# Patient Record
Sex: FEMALE | Race: WHITE | Employment: OTHER | URBAN - METROPOLITAN AREA
[De-identification: names, ages, dates, MRNs, and addresses within clinical notes are randomized per-mention and may not be internally consistent; named-entity substitution may affect disease eponyms.]

---

## 2017-01-27 ENCOUNTER — OFFICE VISIT (OUTPATIENT)
Dept: ORTHOPEDIC SURGERY | Age: 82
End: 2017-01-27

## 2017-01-27 ENCOUNTER — TELEPHONE (OUTPATIENT)
Dept: ORTHOPEDIC SURGERY | Age: 82
End: 2017-01-27

## 2017-01-27 VITALS
SYSTOLIC BLOOD PRESSURE: 147 MMHG | DIASTOLIC BLOOD PRESSURE: 82 MMHG | HEART RATE: 79 BPM | HEIGHT: 66 IN | BODY MASS INDEX: 25.39 KG/M2 | WEIGHT: 158 LBS

## 2017-01-27 DIAGNOSIS — S86.001A ACHILLES TENDON INJURY, RIGHT, INITIAL ENCOUNTER: Primary | ICD-10-CM

## 2017-01-27 PROCEDURE — L4361 PNEUMA/VAC WALK BOOT PRE OTS: HCPCS | Performed by: ORTHOPAEDIC SURGERY

## 2017-01-27 PROCEDURE — 99213 OFFICE O/P EST LOW 20 MIN: CPT | Performed by: ORTHOPAEDIC SURGERY

## 2017-01-27 RX ORDER — M-VIT,TX,IRON,MINS/CALC/FOLIC 27MG-0.4MG
1 TABLET ORAL DAILY
COMMUNITY

## 2017-02-14 ENCOUNTER — OFFICE VISIT (OUTPATIENT)
Dept: ORTHOPEDIC SURGERY | Age: 82
End: 2017-02-14

## 2017-02-14 VITALS
BODY MASS INDEX: 25.4 KG/M2 | DIASTOLIC BLOOD PRESSURE: 83 MMHG | WEIGHT: 158.07 LBS | HEIGHT: 66 IN | SYSTOLIC BLOOD PRESSURE: 127 MMHG | HEART RATE: 118 BPM

## 2017-02-14 DIAGNOSIS — S86.001D ACHILLES TENDON INJURY, RIGHT, SUBSEQUENT ENCOUNTER: Primary | ICD-10-CM

## 2017-02-14 PROCEDURE — 99213 OFFICE O/P EST LOW 20 MIN: CPT | Performed by: ORTHOPAEDIC SURGERY

## 2017-02-17 ENCOUNTER — TELEPHONE (OUTPATIENT)
Dept: ORTHOPEDIC SURGERY | Age: 82
End: 2017-02-17

## 2017-02-22 ENCOUNTER — HOSPITAL ENCOUNTER (OUTPATIENT)
Dept: GENERAL RADIOLOGY | Age: 82
Discharge: HOME | End: 2017-02-22

## 2017-02-22 LAB
A/G RATIO: 1.1 (ref 1.1–2.2)
ALBUMIN SERPL-MCNC: 4.1 G/DL (ref 3.4–5)
ALP BLD-CCNC: 104 U/L (ref 40–129)
ALT SERPL-CCNC: 14 U/L (ref 10–40)
ANION GAP SERPL CALCULATED.3IONS-SCNC: 12 MMOL/L (ref 3–16)
AST SERPL-CCNC: 19 U/L (ref 15–37)
BASOPHILS ABSOLUTE: 0.1 K/UL (ref 0–0.2)
BASOPHILS RELATIVE PERCENT: 1.2 %
BILIRUB SERPL-MCNC: 0.4 MG/DL (ref 0–1)
BUN BLDV-MCNC: 34 MG/DL (ref 7–20)
CALCIUM SERPL-MCNC: 10.6 MG/DL (ref 8.3–10.6)
CHLORIDE BLD-SCNC: 105 MMOL/L (ref 99–110)
CHOLESTEROL, TOTAL: 179 MG/DL (ref 0–199)
CO2: 26 MMOL/L (ref 21–32)
CREAT SERPL-MCNC: 1.4 MG/DL (ref 0.6–1.2)
CREATININE URINE: 99.4 MG/DL (ref 28–259)
EOSINOPHILS ABSOLUTE: 0.1 K/UL (ref 0–0.6)
EOSINOPHILS RELATIVE PERCENT: 1.1 %
GFR AFRICAN AMERICAN: 43
GFR NON-AFRICAN AMERICAN: 35
GLOBULIN: 3.6 G/DL
GLUCOSE BLD-MCNC: 89 MG/DL (ref 70–99)
HCT VFR BLD CALC: 41.1 % (ref 36–48)
HDLC SERPL-MCNC: 64 MG/DL (ref 40–60)
HEMOGLOBIN: 13.6 G/DL (ref 12–16)
IRON SATURATION: 42 % (ref 15–50)
IRON: 117 UG/DL (ref 37–145)
LDL CHOLESTEROL CALCULATED: 95 MG/DL
LYMPHOCYTES ABSOLUTE: 1.6 K/UL (ref 1–5.1)
LYMPHOCYTES RELATIVE PERCENT: 30.7 %
MCH RBC QN AUTO: 30.2 PG (ref 26–34)
MCHC RBC AUTO-ENTMCNC: 33.1 G/DL (ref 31–36)
MCV RBC AUTO: 91.1 FL (ref 80–100)
MICROALBUMIN UR-MCNC: <1.2 MG/DL
MICROALBUMIN/CREAT UR-RTO: NORMAL MG/G (ref 0–30)
MONOCYTES ABSOLUTE: 0.6 K/UL (ref 0–1.3)
MONOCYTES RELATIVE PERCENT: 10.7 %
NEUTROPHILS ABSOLUTE: 3 K/UL (ref 1.7–7.7)
NEUTROPHILS RELATIVE PERCENT: 56.3 %
PARATHYROID HORMONE INTACT: 31.3 PG/ML (ref 14–72)
PDW BLD-RTO: 14.1 % (ref 12.4–15.4)
PLATELET # BLD: 200 K/UL (ref 135–450)
PMV BLD AUTO: 7.7 FL (ref 5–10.5)
POTASSIUM SERPL-SCNC: 4.3 MMOL/L (ref 3.5–5.1)
RBC # BLD: 4.51 M/UL (ref 4–5.2)
SODIUM BLD-SCNC: 143 MMOL/L (ref 136–145)
T3 FREE: 2.4 PG/ML (ref 2.3–4.2)
T4 FREE: 1.1 NG/DL (ref 0.9–1.8)
THYROID PEROXIDASE (TPO) ABS: 15 IU/ML
TOTAL IRON BINDING CAPACITY: 279 UG/DL (ref 260–445)
TOTAL PROTEIN: 7.7 G/DL (ref 6.4–8.2)
TRIGL SERPL-MCNC: 99 MG/DL (ref 0–150)
TSH REFLEX: 3.86 UIU/ML (ref 0.27–4.2)
VITAMIN D 25-HYDROXY: 58.7 NG/ML
VLDLC SERPL CALC-MCNC: 20 MG/DL
WBC # BLD: 5.3 K/UL (ref 4–11)

## 2017-03-07 ENCOUNTER — TELEPHONE (OUTPATIENT)
Dept: ORTHOPEDIC SURGERY | Age: 82
End: 2017-03-07

## 2017-03-14 ENCOUNTER — OFFICE VISIT (OUTPATIENT)
Dept: ORTHOPEDIC SURGERY | Age: 82
End: 2017-03-14

## 2017-03-14 VITALS
DIASTOLIC BLOOD PRESSURE: 58 MMHG | HEIGHT: 66 IN | WEIGHT: 162.92 LBS | SYSTOLIC BLOOD PRESSURE: 107 MMHG | BODY MASS INDEX: 26.18 KG/M2 | HEART RATE: 72 BPM

## 2017-03-14 DIAGNOSIS — S86.001D ACHILLES TENDON INJURY, RIGHT, SUBSEQUENT ENCOUNTER: Primary | ICD-10-CM

## 2017-03-14 PROCEDURE — 99024 POSTOP FOLLOW-UP VISIT: CPT | Performed by: ORTHOPAEDIC SURGERY

## 2017-03-14 PROCEDURE — 29405 APPL SHORT LEG CAST: CPT | Performed by: ORTHOPAEDIC SURGERY

## 2017-03-28 ENCOUNTER — OFFICE VISIT (OUTPATIENT)
Dept: ORTHOPEDIC SURGERY | Age: 82
End: 2017-03-28

## 2017-03-28 VITALS
HEART RATE: 62 BPM | WEIGHT: 162.92 LBS | SYSTOLIC BLOOD PRESSURE: 111 MMHG | DIASTOLIC BLOOD PRESSURE: 54 MMHG | BODY MASS INDEX: 26.18 KG/M2 | HEIGHT: 66 IN

## 2017-03-28 DIAGNOSIS — S86.001D ACHILLES TENDON INJURY, RIGHT, SUBSEQUENT ENCOUNTER: Primary | ICD-10-CM

## 2017-03-28 PROCEDURE — 99024 POSTOP FOLLOW-UP VISIT: CPT | Performed by: ORTHOPAEDIC SURGERY

## 2017-04-25 ENCOUNTER — OFFICE VISIT (OUTPATIENT)
Dept: ORTHOPEDIC SURGERY | Age: 82
End: 2017-04-25

## 2017-04-25 VITALS
SYSTOLIC BLOOD PRESSURE: 130 MMHG | WEIGHT: 162.92 LBS | DIASTOLIC BLOOD PRESSURE: 74 MMHG | HEART RATE: 83 BPM | HEIGHT: 66 IN | BODY MASS INDEX: 26.18 KG/M2

## 2017-04-25 DIAGNOSIS — S86.001D ACHILLES TENDON INJURY, RIGHT, SUBSEQUENT ENCOUNTER: Primary | ICD-10-CM

## 2017-04-25 PROCEDURE — 99024 POSTOP FOLLOW-UP VISIT: CPT | Performed by: ORTHOPAEDIC SURGERY

## 2017-06-06 ENCOUNTER — OFFICE VISIT (OUTPATIENT)
Dept: ORTHOPEDIC SURGERY | Age: 82
End: 2017-06-06

## 2017-06-06 VITALS
DIASTOLIC BLOOD PRESSURE: 76 MMHG | HEART RATE: 62 BPM | WEIGHT: 162.92 LBS | HEIGHT: 66 IN | SYSTOLIC BLOOD PRESSURE: 150 MMHG | BODY MASS INDEX: 26.18 KG/M2

## 2017-06-06 DIAGNOSIS — S86.001D ACHILLES TENDON INJURY, RIGHT, SUBSEQUENT ENCOUNTER: Primary | ICD-10-CM

## 2017-06-06 PROCEDURE — 99212 OFFICE O/P EST SF 10 MIN: CPT | Performed by: ORTHOPAEDIC SURGERY

## 2017-07-18 ENCOUNTER — OFFICE VISIT (OUTPATIENT)
Dept: ORTHOPEDIC SURGERY | Age: 82
End: 2017-07-18

## 2017-07-18 VITALS
BODY MASS INDEX: 25.86 KG/M2 | HEIGHT: 66 IN | SYSTOLIC BLOOD PRESSURE: 138 MMHG | WEIGHT: 160.94 LBS | HEART RATE: 62 BPM | DIASTOLIC BLOOD PRESSURE: 75 MMHG

## 2017-07-18 DIAGNOSIS — S86.001D ACHILLES TENDON INJURY, RIGHT, SUBSEQUENT ENCOUNTER: Primary | ICD-10-CM

## 2017-07-18 PROCEDURE — 99212 OFFICE O/P EST SF 10 MIN: CPT | Performed by: ORTHOPAEDIC SURGERY

## 2018-01-02 ENCOUNTER — OFFICE VISIT (OUTPATIENT)
Dept: ORTHOPEDIC SURGERY | Age: 83
End: 2018-01-02

## 2018-01-02 VITALS
BODY MASS INDEX: 25.86 KG/M2 | HEIGHT: 66 IN | SYSTOLIC BLOOD PRESSURE: 154 MMHG | HEART RATE: 73 BPM | WEIGHT: 160.94 LBS | DIASTOLIC BLOOD PRESSURE: 69 MMHG

## 2018-01-02 DIAGNOSIS — S86.001D INJURY OF RIGHT ACHILLES TENDON, SUBSEQUENT ENCOUNTER: Primary | ICD-10-CM

## 2018-01-02 PROCEDURE — 99212 OFFICE O/P EST SF 10 MIN: CPT | Performed by: ORTHOPAEDIC SURGERY

## 2018-08-19 ENCOUNTER — APPOINTMENT (OUTPATIENT)
Dept: GENERAL RADIOLOGY | Age: 83
DRG: 069 | End: 2018-08-19
Payer: COMMERCIAL

## 2018-08-19 ENCOUNTER — APPOINTMENT (OUTPATIENT)
Dept: CT IMAGING | Age: 83
DRG: 069 | End: 2018-08-19
Payer: COMMERCIAL

## 2018-08-19 ENCOUNTER — HOSPITAL ENCOUNTER (INPATIENT)
Age: 83
LOS: 1 days | Discharge: HOME | DRG: 069 | End: 2018-08-20
Attending: EMERGENCY MEDICINE | Admitting: HOSPITALIST
Payer: COMMERCIAL

## 2018-08-19 DIAGNOSIS — G45.9 TRANSIENT CEREBRAL ISCHEMIA, UNSPECIFIED TYPE: Primary | ICD-10-CM

## 2018-08-19 DIAGNOSIS — N30.00 ACUTE CYSTITIS WITHOUT HEMATURIA: ICD-10-CM

## 2018-08-19 PROBLEM — N39.0 UTI (URINARY TRACT INFECTION): Status: ACTIVE | Noted: 2018-08-19

## 2018-08-19 PROBLEM — R42 DIZZINESS: Status: ACTIVE | Noted: 2018-08-19

## 2018-08-19 LAB
A/G RATIO: 1.2 (ref 1.1–2.2)
ALBUMIN SERPL-MCNC: 3.9 G/DL (ref 3.4–5)
ALP BLD-CCNC: 81 U/L (ref 40–129)
ALT SERPL-CCNC: 12 U/L (ref 10–40)
ANION GAP SERPL CALCULATED.3IONS-SCNC: 12 MMOL/L (ref 3–16)
AST SERPL-CCNC: 19 U/L (ref 15–37)
BACTERIA: ABNORMAL /HPF
BASOPHILS ABSOLUTE: 0.1 K/UL (ref 0–0.2)
BASOPHILS RELATIVE PERCENT: 0.8 %
BILIRUB SERPL-MCNC: 0.3 MG/DL (ref 0–1)
BILIRUBIN URINE: NEGATIVE
BLOOD, URINE: NEGATIVE
BUN BLDV-MCNC: 31 MG/DL (ref 7–20)
CALCIUM SERPL-MCNC: 9.5 MG/DL (ref 8.3–10.6)
CHLORIDE BLD-SCNC: 107 MMOL/L (ref 99–110)
CLARITY: CLEAR
CO2: 25 MMOL/L (ref 21–32)
COLOR: YELLOW
CREAT SERPL-MCNC: 1.6 MG/DL (ref 0.6–1.2)
EOSINOPHILS ABSOLUTE: 0.1 K/UL (ref 0–0.6)
EOSINOPHILS RELATIVE PERCENT: 1.3 %
GFR AFRICAN AMERICAN: 37
GFR NON-AFRICAN AMERICAN: 30
GLOBULIN: 3.2 G/DL
GLUCOSE BLD-MCNC: 141 MG/DL (ref 70–99)
GLUCOSE URINE: NEGATIVE MG/DL
HCT VFR BLD CALC: 36.8 % (ref 36–48)
HEMOGLOBIN: 12.6 G/DL (ref 12–16)
KETONES, URINE: NEGATIVE MG/DL
LEUKOCYTE ESTERASE, URINE: ABNORMAL
LYMPHOCYTES ABSOLUTE: 1.9 K/UL (ref 1–5.1)
LYMPHOCYTES RELATIVE PERCENT: 29.2 %
MCH RBC QN AUTO: 31 PG (ref 26–34)
MCHC RBC AUTO-ENTMCNC: 34.3 G/DL (ref 31–36)
MCV RBC AUTO: 90.5 FL (ref 80–100)
MICROSCOPIC EXAMINATION: YES
MONOCYTES ABSOLUTE: 0.7 K/UL (ref 0–1.3)
MONOCYTES RELATIVE PERCENT: 11.7 %
NEUTROPHILS ABSOLUTE: 3.6 K/UL (ref 1.7–7.7)
NEUTROPHILS RELATIVE PERCENT: 57 %
NITRITE, URINE: POSITIVE
PDW BLD-RTO: 14.1 % (ref 12.4–15.4)
PH UA: 7.5
PLATELET # BLD: 199 K/UL (ref 135–450)
PMV BLD AUTO: 6.7 FL (ref 5–10.5)
POTASSIUM SERPL-SCNC: 4.4 MMOL/L (ref 3.5–5.1)
PROTEIN UA: NEGATIVE MG/DL
RBC # BLD: 4.06 M/UL (ref 4–5.2)
RBC UA: ABNORMAL /HPF (ref 0–2)
SODIUM BLD-SCNC: 144 MMOL/L (ref 136–145)
SPECIFIC GRAVITY UA: 1.01
SPECIMEN STATUS: NORMAL
TOTAL PROTEIN: 7.1 G/DL (ref 6.4–8.2)
TROPONIN: <0.01 NG/ML
URINE REFLEX TO CULTURE: YES
URINE TYPE: ABNORMAL
UROBILINOGEN, URINE: 0.2 E.U./DL
WBC # BLD: 6.4 K/UL (ref 4–11)
WBC UA: ABNORMAL /HPF (ref 0–5)

## 2018-08-19 PROCEDURE — 80053 COMPREHEN METABOLIC PANEL: CPT

## 2018-08-19 PROCEDURE — 87077 CULTURE AEROBIC IDENTIFY: CPT

## 2018-08-19 PROCEDURE — 93005 ELECTROCARDIOGRAM TRACING: CPT | Performed by: EMERGENCY MEDICINE

## 2018-08-19 PROCEDURE — 99285 EMERGENCY DEPT VISIT HI MDM: CPT

## 2018-08-19 PROCEDURE — 2580000003 HC RX 258: Performed by: EMERGENCY MEDICINE

## 2018-08-19 PROCEDURE — 1200000000 HC SEMI PRIVATE

## 2018-08-19 PROCEDURE — 87086 URINE CULTURE/COLONY COUNT: CPT

## 2018-08-19 PROCEDURE — 6360000002 HC RX W HCPCS: Performed by: EMERGENCY MEDICINE

## 2018-08-19 PROCEDURE — 87040 BLOOD CULTURE FOR BACTERIA: CPT

## 2018-08-19 PROCEDURE — 87801 DETECT AGNT MULT DNA AMPLI: CPT

## 2018-08-19 PROCEDURE — 6370000000 HC RX 637 (ALT 250 FOR IP): Performed by: EMERGENCY MEDICINE

## 2018-08-19 PROCEDURE — 6370000000 HC RX 637 (ALT 250 FOR IP): Performed by: HOSPITALIST

## 2018-08-19 PROCEDURE — 71046 X-RAY EXAM CHEST 2 VIEWS: CPT

## 2018-08-19 PROCEDURE — 70450 CT HEAD/BRAIN W/O DYE: CPT

## 2018-08-19 PROCEDURE — G0378 HOSPITAL OBSERVATION PER HR: HCPCS

## 2018-08-19 PROCEDURE — 2580000003 HC RX 258: Performed by: HOSPITALIST

## 2018-08-19 PROCEDURE — 85025 COMPLETE CBC W/AUTO DIFF WBC: CPT

## 2018-08-19 PROCEDURE — 96365 THER/PROPH/DIAG IV INF INIT: CPT

## 2018-08-19 PROCEDURE — 87186 SC STD MICRODIL/AGAR DIL: CPT

## 2018-08-19 PROCEDURE — 84484 ASSAY OF TROPONIN QUANT: CPT

## 2018-08-19 PROCEDURE — 81001 URINALYSIS AUTO W/SCOPE: CPT

## 2018-08-19 RX ORDER — SODIUM CHLORIDE 0.9 % (FLUSH) 0.9 %
10 SYRINGE (ML) INJECTION EVERY 12 HOURS SCHEDULED
Status: DISCONTINUED | OUTPATIENT
Start: 2018-08-19 | End: 2018-08-20 | Stop reason: HOSPADM

## 2018-08-19 RX ORDER — ASPIRIN 81 MG/1
81 TABLET ORAL DAILY
Status: DISCONTINUED | OUTPATIENT
Start: 2018-08-20 | End: 2018-08-20 | Stop reason: HOSPADM

## 2018-08-19 RX ORDER — M-VIT,TX,IRON,MINS/CALC/FOLIC 27MG-0.4MG
1 TABLET ORAL DAILY
Status: DISCONTINUED | OUTPATIENT
Start: 2018-08-20 | End: 2018-08-20 | Stop reason: HOSPADM

## 2018-08-19 RX ORDER — ONDANSETRON 2 MG/ML
4 INJECTION INTRAMUSCULAR; INTRAVENOUS EVERY 6 HOURS PRN
Status: DISCONTINUED | OUTPATIENT
Start: 2018-08-19 | End: 2018-08-20 | Stop reason: HOSPADM

## 2018-08-19 RX ORDER — ASPIRIN 325 MG
325 TABLET ORAL ONCE
Status: COMPLETED | OUTPATIENT
Start: 2018-08-19 | End: 2018-08-19

## 2018-08-19 RX ORDER — LABETALOL HYDROCHLORIDE 5 MG/ML
10 INJECTION, SOLUTION INTRAVENOUS EVERY 10 MIN PRN
Status: DISCONTINUED | OUTPATIENT
Start: 2018-08-19 | End: 2018-08-20 | Stop reason: HOSPADM

## 2018-08-19 RX ORDER — SODIUM CHLORIDE 0.9 % (FLUSH) 0.9 %
10 SYRINGE (ML) INJECTION PRN
Status: DISCONTINUED | OUTPATIENT
Start: 2018-08-19 | End: 2018-08-20 | Stop reason: HOSPADM

## 2018-08-19 RX ORDER — ACETAMINOPHEN 325 MG/1
650 TABLET ORAL EVERY 4 HOURS PRN
Status: DISCONTINUED | OUTPATIENT
Start: 2018-08-19 | End: 2018-08-20 | Stop reason: HOSPADM

## 2018-08-19 RX ORDER — OXYBUTYNIN CHLORIDE 5 MG/1
5 TABLET, EXTENDED RELEASE ORAL DAILY
Status: DISCONTINUED | OUTPATIENT
Start: 2018-08-20 | End: 2018-08-20 | Stop reason: HOSPADM

## 2018-08-19 RX ORDER — ASPIRIN 81 MG/1
324 TABLET, CHEWABLE ORAL ONCE
Status: COMPLETED | OUTPATIENT
Start: 2018-08-19 | End: 2018-08-19

## 2018-08-19 RX ORDER — ATORVASTATIN CALCIUM 40 MG/1
40 TABLET, FILM COATED ORAL NIGHTLY
Status: DISCONTINUED | OUTPATIENT
Start: 2018-08-19 | End: 2018-08-20 | Stop reason: HOSPADM

## 2018-08-19 RX ADMIN — SODIUM CHLORIDE, PRESERVATIVE FREE 10 ML: 5 INJECTION INTRAVENOUS at 20:09

## 2018-08-19 RX ADMIN — ASPIRIN 325 MG: 325 TABLET ORAL at 20:04

## 2018-08-19 RX ADMIN — CEFTRIAXONE SODIUM 1 G: 1 INJECTION, POWDER, FOR SOLUTION INTRAMUSCULAR; INTRAVENOUS at 17:28

## 2018-08-19 RX ADMIN — ASPIRIN 81 MG 324 MG: 81 TABLET ORAL at 20:04

## 2018-08-19 RX ADMIN — ATORVASTATIN CALCIUM 40 MG: 40 TABLET, FILM COATED ORAL at 20:04

## 2018-08-19 RX ADMIN — APIXABAN 2.5 MG: 5 TABLET, FILM COATED ORAL at 20:05

## 2018-08-19 ASSESSMENT — ENCOUNTER SYMPTOMS
SORE THROAT: 0
VOMITING: 0
NAUSEA: 0
BACK PAIN: 0
EYE PAIN: 0
SHORTNESS OF BREATH: 0
COUGH: 0
ABDOMINAL PAIN: 0
EYE REDNESS: 0

## 2018-08-19 ASSESSMENT — PAIN SCALES - GENERAL: PAINLEVEL_OUTOF10: 0

## 2018-08-19 NOTE — ED PROVIDER NOTES
Emergency [facility]  ED    Patient: Dialn Cunningham  MRN: [id number]  : 1928  Date of Evaluation: 2018  ED Provider: Bharti Carlos DO    Chief Complaint       Chief Complaint   Patient presents with    Numbness     vertigo - has been dizzy and had a spell this morning, no nausea, vomiting, or diarrhea or chest pain. Pt states her blood pressure was low and then her left arm started to go numb. Her jaw started to go numb all started around 1300 today. HPI     History provided by patient and family  Mode of arrival: EMS  History limited by no limitations    Dilan Cunningham is a 80 y.o. female who presents to the emergency department With complaints of dizziness and numbness. States this started earlier today that she was dizzy. Also states she had numbness to her left face and left upper extremity. Unsure of exactly when this started. Per EMS, no neuro deficits. Patient asymptomatic on arrival.      ROS:     Review of Systems   Constitutional: Negative for chills and fever. HENT: Negative for congestion and sore throat. Eyes: Negative for pain and redness. Respiratory: Negative for cough and shortness of breath. Cardiovascular: Negative for chest pain and palpitations. Gastrointestinal: Negative for abdominal pain, nausea and vomiting. Genitourinary: Negative for dysuria and frequency. Musculoskeletal: Negative for back pain and gait problem. Skin: Negative for pallor and rash. Neurological: Positive for dizziness and numbness. Negative for headaches. Psychiatric/Behavioral: Negative for agitation and confusion. All other systems reviewed and are negative.       Past History     Past Medical History:   Diagnosis Date    A-fib [facility])     Achilles tendon injury 2016    Arthritis     Hyperlipidemia     Hypertension     Kidney insufficiency     MI, old     PONV (postoperative nausea and vomiting)     Urinary frequency     UTI (urinary tract infection) 8/19/2018     Past Surgical History:   Procedure Laterality Date    ACHILLES TENDON SURGERY  03/02/2017    R achilles tendon debridement     BACK SURGERY  2003    back surgery x3; lower back    CARPAL TUNNEL RELEASE Right 2002    CORONARY ANGIOPLASTY WITH STENT PLACEMENT  2006    HIP SURGERY Left 1/21/2016    hip pinning    HYSTERECTOMY  1965     Social History     Social History    Marital status:      Spouse name: N/A    Number of children: N/A    Years of education: N/A     Social History Main Topics    Smoking status: Never Smoker    Smokeless tobacco: Never Used    Alcohol use No    Drug use: No    Sexual activity: No     Other Topics Concern    None     Social History Narrative    None     History reviewed. No pertinent family history. Medications/Allergies     Previous Medications    ACETAMINOPHEN 650 MG TABS    Take 650 mg by mouth every 4 hours as needed    CRESTOR 10 MG TABLET    TK 1 T PO QOD (pt states she takes 3-4 tabs per week)    ELIQUIS 2.5 MG TABS TABLET    TK 1 T PO BID    LISINOPRIL (PRINIVIL;ZESTRIL) 20 MG TABLET    Take 20 mg by mouth daily    METOPROLOL SUCCINATE (TOPROL XL) 25 MG EXTENDED RELEASE TABLET    Take 25 mg by mouth 2 times daily    MULTIPLE VITAMINS-MINERALS (THERAPEUTIC MULTIVITAMIN-MINERALS) TABLET    Take 1 tablet by mouth daily    OXYBUTYNIN (DITROPAN XL) 5 MG EXTENDED RELEASE TABLET    Take 1 tablet by mouth daily     Allergies   Allergen Reactions    Epinephrine      \"knocks me out\"    Other      Novocaine - when at the dentist        Physical Exam       ED Triage Vitals [08/19/18 1520]   BP Temp Temp Source Pulse Resp SpO2 Height Weight   (!) 180/52 97.8 °F (36.6 °C) Oral 64 11 100 % 5' 6\" (1.676 m) 161 lb (73 kg)       Physical Exam   Constitutional: She is oriented to person, place, and time. She appears well-developed and well-nourished. No distress. HENT:   Head: Normocephalic and atraumatic.    Nose: Nose normal.   Eyes: Pupils are equal, round, and reactive to light. Conjunctivae and EOM are normal.   Neck: Normal range of motion. Neck supple. Cardiovascular: Normal rate, regular rhythm and normal heart sounds. Pulmonary/Chest: Effort normal and breath sounds normal. No respiratory distress. She has no wheezes. She has no rales. Abdominal: Soft. Bowel sounds are normal. She exhibits no distension. There is no tenderness. There is no rebound. Musculoskeletal: Normal range of motion. She exhibits no edema, tenderness or deformity. Neurological: She is alert and oriented to person, place, and time. She has normal strength. No cranial nerve deficit or sensory deficit. GCS eye subscore is 4. GCS verbal subscore is 5. GCS motor subscore is 6. Skin: Skin is warm and dry. No rash noted. She is not diaphoretic. No erythema. No pallor. Psychiatric: She has a normal mood and affect. Her behavior is normal. Thought content normal.   Nursing note and vitals reviewed.       Diagnostics   Labs:  Results for orders placed or performed during the hospital encounter of 08/19/18   CBC Auto Differential   Result Value Ref Range    WBC 6.4 4.0 - 11.0 K/uL    RBC 4.06 4.00 - 5.20 M/uL    Hemoglobin 12.6 12.0 - 16.0 g/dL    Hematocrit 36.8 36.0 - 48.0 %    MCV 90.5 80.0 - 100.0 fL    MCH 31.0 26.0 - 34.0 pg    MCHC 34.3 31.0 - 36.0 g/dL    RDW 14.1 12.4 - 15.4 %    Platelets 345 147 - 984 K/uL    MPV 6.7 5.0 - 10.5 fL    Neutrophils % 57.0 %    Lymphocytes % 29.2 %    Monocytes % 11.7 %    Eosinophils % 1.3 %    Basophils % 0.8 %    Neutrophils # 3.6 1.7 - 7.7 K/uL    Lymphocytes # 1.9 1.0 - 5.1 K/uL    Monocytes # 0.7 0.0 - 1.3 K/uL    Eosinophils # 0.1 0.0 - 0.6 K/uL    Basophils # 0.1 0.0 - 0.2 K/uL   Comprehensive Metabolic Panel   Result Value Ref Range    Sodium 144 136 - 145 mmol/L    Potassium 4.4 3.5 - 5.1 mmol/L    Chloride 107 99 - 110 mmol/L    CO2 25 21 - 32 mmol/L    Anion Gap 12 3 - 16    Glucose 141 (H) 70 - Alert  LOC Questions (1b. ): Answers both correctly  LOC Commands (1c. ): Obeys both correctly  Motor Arm, Left (5a. ): No drift (numbness in left arm)  Motor Arm, Right (5b. ): No drift  Motor Leg, Left (6a. ): No drift  Motor Leg, Right (6b. ): No drift  Best Language (9. ): No aphasia  Dysarthria (10. ): Normal       MDM  Number of Diagnoses or Management Options  Acute cystitis without hematuria: new and requires workup  Transient cerebral ischemia, unspecified type: new and requires workup     Amount and/or Complexity of Data Reviewed  Clinical lab tests: ordered and reviewed  Tests in the radiology section of CPT®: ordered and reviewed  Obtain history from someone other than the patient: yes  Discuss the patient with other providers: yes  Independent visualization of images, tracings, or specimens: yes    Risk of Complications, Morbidity, and/or Mortality  Presenting problems: high  Diagnostic procedures: high  Management options: high        In brief, Petra Rodriguez is a 80 y.o. female who presented to the emergency department With complaints of dizziness and left-sided numbness earlier today. Patient had normal neuro exam on arrival so code stroke was not called. Differential includes but is not limited to ACS, TIA, infection, anemia, arrhythmia, electrolyte abnormality. 1702 Discussed with patient and family diagnosis and need for admission. Patient remains without neuro deficits. 80 Discussed with hospitalist, accepted for admission.      ED Medication Orders     Start Ordered     Status Ordering Provider    08/19/18 1730 08/19/18 1715  aspirin tablet 325 mg  ONCE      Acknowledged SHARON PEREZ    08/19/18 1700 08/19/18 1658  cefTRIAXone (ROCEPHIN) 1 g IVPB in 50 mL D5W minibag  ONCE      Last MAR action:  New Bag - by Emma Santana on 08/19/18 at 4801 [address] SHARON Howell        Vitals:    08/19/18 1520 08/19/18 1630 08/19/18 1731   BP: (!) 180/52 (!) 129/50 (!) 139/47   Pulse: 64 67 68

## 2018-08-19 NOTE — PROGRESS NOTES
PS hosp @ 1702  RE: TIA, UTI per Dr. Yue Schmitt returned call @ [phone], spoke to Dr. Yue Esteban.

## 2018-08-20 ENCOUNTER — APPOINTMENT (OUTPATIENT)
Dept: MRI IMAGING | Age: 83
DRG: 069 | End: 2018-08-20
Payer: COMMERCIAL

## 2018-08-20 VITALS
WEIGHT: 161 LBS | TEMPERATURE: 97.8 F | RESPIRATION RATE: 20 BRPM | OXYGEN SATURATION: 95 % | DIASTOLIC BLOOD PRESSURE: 53 MMHG | HEART RATE: 72 BPM | BODY MASS INDEX: 25.88 KG/M2 | HEIGHT: 66 IN | SYSTOLIC BLOOD PRESSURE: 134 MMHG

## 2018-08-20 LAB
ANION GAP SERPL CALCULATED.3IONS-SCNC: 11 MMOL/L (ref 3–16)
BASOPHILS ABSOLUTE: 0 K/UL (ref 0–0.2)
BASOPHILS RELATIVE PERCENT: 0.9 %
BUN BLDV-MCNC: 31 MG/DL (ref 7–20)
CALCIUM SERPL-MCNC: 9.1 MG/DL (ref 8.3–10.6)
CHLORIDE BLD-SCNC: 110 MMOL/L (ref 99–110)
CHOLESTEROL, TOTAL: 142 MG/DL (ref 0–199)
CO2: 23 MMOL/L (ref 21–32)
CREAT SERPL-MCNC: 1.5 MG/DL (ref 0.6–1.2)
EOSINOPHILS ABSOLUTE: 0.1 K/UL (ref 0–0.6)
EOSINOPHILS RELATIVE PERCENT: 2.1 %
GFR AFRICAN AMERICAN: 39
GFR NON-AFRICAN AMERICAN: 33
GLUCOSE BLD-MCNC: 97 MG/DL (ref 70–99)
HCT VFR BLD CALC: 35.3 % (ref 36–48)
HDLC SERPL-MCNC: 52 MG/DL (ref 40–60)
HEMOGLOBIN: 11.7 G/DL (ref 12–16)
LDL CHOLESTEROL CALCULATED: 74 MG/DL
LV EF: 58 %
LVEF MODALITY: NORMAL
LYMPHOCYTES ABSOLUTE: 1.8 K/UL (ref 1–5.1)
LYMPHOCYTES RELATIVE PERCENT: 38.1 %
MCH RBC QN AUTO: 30 PG (ref 26–34)
MCHC RBC AUTO-ENTMCNC: 33.1 G/DL (ref 31–36)
MCV RBC AUTO: 90.5 FL (ref 80–100)
MONOCYTES ABSOLUTE: 0.6 K/UL (ref 0–1.3)
MONOCYTES RELATIVE PERCENT: 13.3 %
NEUTROPHILS ABSOLUTE: 2.1 K/UL (ref 1.7–7.7)
NEUTROPHILS RELATIVE PERCENT: 45.6 %
PDW BLD-RTO: 13.8 % (ref 12.4–15.4)
PLATELET # BLD: 173 K/UL (ref 135–450)
PMV BLD AUTO: 6.5 FL (ref 5–10.5)
POTASSIUM REFLEX MAGNESIUM: 4.3 MMOL/L (ref 3.5–5.1)
RBC # BLD: 3.9 M/UL (ref 4–5.2)
SODIUM BLD-SCNC: 144 MMOL/L (ref 136–145)
TRIGL SERPL-MCNC: 79 MG/DL (ref 0–150)
VLDLC SERPL CALC-MCNC: 16 MG/DL
WBC # BLD: 4.6 K/UL (ref 4–11)

## 2018-08-20 PROCEDURE — 97530 THERAPEUTIC ACTIVITIES: CPT

## 2018-08-20 PROCEDURE — 2580000003 HC RX 258: Performed by: HOSPITALIST

## 2018-08-20 PROCEDURE — 6360000002 HC RX W HCPCS: Performed by: HOSPITALIST

## 2018-08-20 PROCEDURE — 93010 ELECTROCARDIOGRAM REPORT: CPT | Performed by: INTERNAL MEDICINE

## 2018-08-20 PROCEDURE — 96365 THER/PROPH/DIAG IV INF INIT: CPT

## 2018-08-20 PROCEDURE — 93306 TTE W/DOPPLER COMPLETE: CPT | Performed by: INTERNAL MEDICINE

## 2018-08-20 PROCEDURE — 85025 COMPLETE CBC W/AUTO DIFF WBC: CPT

## 2018-08-20 PROCEDURE — G0378 HOSPITAL OBSERVATION PER HR: HCPCS

## 2018-08-20 PROCEDURE — 36415 COLL VENOUS BLD VENIPUNCTURE: CPT

## 2018-08-20 PROCEDURE — 6370000000 HC RX 637 (ALT 250 FOR IP): Performed by: HOSPITALIST

## 2018-08-20 PROCEDURE — G8978 MOBILITY CURRENT STATUS: HCPCS

## 2018-08-20 PROCEDURE — 97161 PT EVAL LOW COMPLEX 20 MIN: CPT

## 2018-08-20 PROCEDURE — 97166 OT EVAL MOD COMPLEX 45 MIN: CPT

## 2018-08-20 PROCEDURE — 80048 BASIC METABOLIC PNL TOTAL CA: CPT

## 2018-08-20 PROCEDURE — 94761 N-INVAS EAR/PLS OXIMETRY MLT: CPT

## 2018-08-20 PROCEDURE — G8979 MOBILITY GOAL STATUS: HCPCS

## 2018-08-20 PROCEDURE — 93880 EXTRACRANIAL BILAT STUDY: CPT

## 2018-08-20 PROCEDURE — 97116 GAIT TRAINING THERAPY: CPT

## 2018-08-20 PROCEDURE — 80061 LIPID PANEL: CPT

## 2018-08-20 PROCEDURE — G8988 SELF CARE GOAL STATUS: HCPCS

## 2018-08-20 PROCEDURE — G8987 SELF CARE CURRENT STATUS: HCPCS

## 2018-08-20 PROCEDURE — 70551 MRI BRAIN STEM W/O DYE: CPT

## 2018-08-20 PROCEDURE — 2580000003 HC RX 258

## 2018-08-20 RX ORDER — CIPROFLOXACIN 500 MG/1
250 TABLET, FILM COATED ORAL 2 TIMES DAILY
Qty: 3 TABLET | Refills: 0 | Status: SHIPPED | OUTPATIENT
Start: 2018-08-20 | End: 2018-08-23

## 2018-08-20 RX ORDER — SODIUM CHLORIDE 9 MG/ML
INJECTION, SOLUTION INTRAVENOUS
Status: COMPLETED
Start: 2018-08-20 | End: 2018-08-20

## 2018-08-20 RX ADMIN — OXYBUTYNIN CHLORIDE 5 MG: 5 TABLET, EXTENDED RELEASE ORAL at 10:01

## 2018-08-20 RX ADMIN — Medication 1 TABLET: at 10:01

## 2018-08-20 RX ADMIN — CEFTRIAXONE SODIUM 1 G: 1 INJECTION, POWDER, FOR SOLUTION INTRAMUSCULAR; INTRAVENOUS at 15:25

## 2018-08-20 RX ADMIN — ASPIRIN 81 MG: 81 TABLET ORAL at 09:58

## 2018-08-20 RX ADMIN — SODIUM CHLORIDE 500 ML: 9 INJECTION, SOLUTION INTRAVENOUS at 15:25

## 2018-08-20 RX ADMIN — SODIUM CHLORIDE, PRESERVATIVE FREE 10 ML: 5 INJECTION INTRAVENOUS at 10:01

## 2018-08-20 RX ADMIN — APIXABAN 2.5 MG: 5 TABLET, FILM COATED ORAL at 09:59

## 2018-08-20 ASSESSMENT — PAIN SCALES - GENERAL: PAINLEVEL_OUTOF10: 0

## 2018-08-20 NOTE — PROGRESS NOTES
(Daughter)  Referring Practitioner: Kareem Murphy MD  Diagnosis: TIA  Subjective  Subjective: Pt semi supine in bed upon arrival, agreeable to OT eval and treat  General Comment  Comments: RN cleared pt to participate in OT eval.  Pain Assessment  Patient Currently in Pain: No  Pain Assessment: 0-10  Pain Level: 0  Oxygen Therapy  SpO2: 99 %  Pulse Oximeter Device Mode: Intermittent  Social/Functional History  Social/Functional History  Lives With: Son (who works during the day; daughter comes at least once per week)  Type of Home: House  Home Layout: One level, Performs ADL's on one level  Home Access: Stairs to enter with rails  Entrance Stairs - Number of Steps: 2  Bathroom Shower/Tub: Tub/Shower unit  Bathroom Toilet: Handicap height  Bathroom Equipment: Shower chair (using occasionally)  Home Equipment: Reacher, Rolling walker, [employer] Help From: Family  ADL Assistance: Independent  Homemaking Assistance: Independent  Homemaking Responsibilities: Yes  Ambulation Assistance: Independent  Transfer Assistance: Independent  Active : Yes (to/from [facility] short distances only)  Additional Comments: pt reports no recent falls within the past few months; pt has 24 hour assistance available if needed       Objective   Vision: Within Functional Limits (pt reports having cataracts removed recently)  Hearing: Within functional limits    Orientation  Overall Orientation Status: Within Functional Limits     Balance  Sitting Balance: Independent  Standing Balance: Stand by assistance  Standing Balance  Time: 5 mins  Activity: functional mobility from bed to chair, chair to hallway and back  Sit to stand: Supervision  Stand to sit: Supervision  Comment: no AE  Functional Mobility  Functional - Mobility Device: No device  Activity: Other (in room, in hallway short distance)  Assist Level: Stand by assistance  ADL  Additional Comments: Pt declined ADLs this date however anticipate pt to require CGA to SBA        Bed mobility  Rolling to Right: Supervision  Sit to Supine: Supervision  Transfers  Sit to stand: Supervision  Stand to sit: Supervision  Vision - Basic Assessment  Visual Field Cut:  (noted some R side decreased attention with one eye occluded for peripheral testing, however possibly due to decreased attention)  Oculo Motor Control:  (pt noted with some difficulty scanning L to R and following OT finger for circular motion, daughter also reported pt distracted this date)  Vision Comments: pt also demonstrated some difficulty following commands during visual testing/ some decreased attention therefore possibly skewing results  Cognition  Overall Cognitive Status: Exceptions  Following Commands: Follows multistep commands with increased time  Attention Span: Difficulty dividing attention  Insights: Decreased awareness of deficits (pt reporting \"Next time this weakness happens I'll just stay at home because I'm better now\")        Sensation  Overall Sensation Status: WFL        LUE AROM (degrees)  LUE AROM : WFL  Left Hand AROM (degrees)  Left Hand AROM: WFL  RUE AROM (degrees)  RUE AROM : WFL  Right Hand AROM (degrees)  Right Hand AROM: WFL  LUE Strength  Gross LUE Strength: WNL  RUE Strength  Gross RUE Strength: WNL       Treatment included functional transfer training, ADLs, and patient education. Assessment   Performance deficits / Impairments: Decreased functional mobility ; Decreased endurance;Decreased coordination;Decreased ADL status; Decreased balance;Decreased high-level IADLs  Assessment: Prior to admission pt was independent with ADLs and transfers, living with her son who works during the day. Pt was completing ADLs and functional mobility within her home with no AE, pt was driving. Pt now presents near baseline, s/p TIA; pt completing functional mobility with SBA and anticipated SBA for ADLs.  Pt would benefit from continued skilled OT services while in acute care, pt reports planning to return home with initial 24 hr A. AMPAC score indicates homebound dc. Treatment Diagnosis: decreased ADLs and transfers secondary to TIA  Prognosis: Fair  Decision Making: Medium Complexity  Patient Education: role of OT- verb understanding  Barriers to Learning: attention  REQUIRES OT FOLLOW UP: Yes  Activity Tolerance  Activity Tolerance: Patient Tolerated treatment well  Activity Tolerance: Pt reported min fatigue upon return to bedside chair from short mobility session  Safety Devices  Safety Devices in place: Yes  Type of devices: Left in chair;Nurse notified;Call light within reach; Chair alarm in place         Plan   Plan  Times per week: 3-5  Times per day: Daily  Current Treatment Recommendations: Strengthening, Balance Training, Functional Mobility Training, Endurance Training, Self-Care / ADL    G-Code  OT G-codes  Functional Assessment Tool Used: OT ADL AMPAC  Score: 21  Functional Limitation: Self care  Self Care Current Status (): At least 20 percent but less than 40 percent impaired, limited or restricted  Self Care Goal Status ():  At least 1 percent but less than 20 percent impaired, limited or restricted  AM-PAC Score        AM-PAC Inpatient Daily Activity Raw Score: 21  AM-PAC Inpatient ADL T-Scale Score : 44.27  ADL Inpatient CMS 0-100% Score: 32.79  ADL Inpatient CMS G-Code Modifier : CJ    Goals  Short term goals  Time Frame for Short term goals: by dc  Short term goal 1: Pt will complete functional mobility/ item retrieval with no AE at SBA  Short term goal 2: Pt will complete LB dressing with SBA  Short term goal 3: Pt will complete toileting with SBA  Short term goal 4: Pt will complete standing level grooming tasks with SBA x 5 mins  Patient Goals   Patient goals : to go home today       Therapy Time   Individual Concurrent Group Co-treatment   Time In 0837         Time Out 0915         Minutes 38         Timed Code Treatment Minutes: 28 Minutes (10 min eval)    If

## 2018-08-20 NOTE — PLAN OF CARE
Problem: Falls - Risk of:  Goal: Will remain free from falls  Will remain free from falls   Outcome: Ongoing  Patient remains free from falls during this shift. Oriented to call light. Verbalizes understanding. Alert and oriented x4. Bedside table and call light within reach. Bed in lowest position, brakes locked. Nonskid footwear in place. Will continue to monitor.

## 2018-08-20 NOTE — PLAN OF CARE
Problem: Neurological  Intervention: OT Evaluation/treatment  Pt will participate in OT to increase transfers and ADLs.

## 2018-08-20 NOTE — DISCHARGE SUMMARY
Hospital Discharge Summary    Patient's PCP: Ariella Rizzo MD  Admit Date: 8/19/2018   Discharge Date: 8/20/2018    Admitting Physician: Dr. Quan Thrasher MD  Discharge Physician: Dr. Edinson Valencia   Consults: none    HPI: 81 yo F presented with acute onset L facial, L arm numbness, found to have suspected TIA, UTI, admitted for further care. Brief hospital course:   Admitted with transient L facial, L arm numbness, resolved prior to arrival to ED, concerning for TIA. Hx of atrial fibrillation on Eliquis. Also dc with UTI. MRI brain neg, TTE and Carotid Salo also negative. Plan to dc on PO Cipro to complete abx course, cont home Eliquis, Toprol, Lisinopril, Norvasc, Crestor. Decided to hold on addition of antiplatelet agent given already on [facility] and age. If has another TIA, would consider adding ASA to Eliquis. Given rapid unexpected improvement, pt was dc'd in less than 2 midnights. Discharge Diagnoses:   Patient Active Problem List   Diagnosis Code    Hypertension I10    Hyperlipidemia E78.5    Atrial fibrillation ([address] I48.91    Arthritis M19.90    Closed left hip fracture ([address] S72.002A    Achilles tendon injury S86.009A    TIA (transient ischemic attack) G45.9    UTI (urinary tract infection) N39.0    Dizziness R42       Physical Exam: BP (!) 161/67   Pulse 60   Temp 97.4 °F (36.3 °C) (Oral)   Resp 20   Ht 5' 6\" (1.676 m)   Wt 161 lb (73 kg)   SpO2 99%   BMI 25.99 kg/m²     No results for input(s): POCGLU in the last 72 hours.     General appearance: alert, appears stated age and cooperative  Head: Normocephalic, without obvious abnormality, atraumatic  Neck: no adenopathy, no carotid bruit, no JVD, supple, symmetrical, trachea midline and thyroid not enlarged, symmetric, no tenderness/mass/nodules  Lungs: clear to auscultation bilaterally  Heart: regular rate and rhythm, S1, S2 normal, no murmur, click, rub or gallop  Abdomen: soft, non-tender; bowel sounds normal; patients care. If you have any questions or concerns please feel free to contact me at [phone].      Dr Richelle Natarajan

## 2018-08-20 NOTE — CARE COORDINATION
CASE MANAGEMENT INITIAL ASSESSMENT      Reviewed chart and met with patient today, re: 80year old female admitted with TIA, PT/OT rec's of home with 24 hour supervision or assist  Explained Case Management role/services. Family present: yes  Primary contact information: Edwin Hurtado [phone]    Admit date/status: 8/19/18  Diagnosis: TIA    Insurance: [facility] required for SNF - Y       3 night stay required - N    Living arrangements, Adls, care needs, prior to admission: lives in one story house with her son, no services prior to admission     Transportation: son drives    Services in the home and/or outpatient, prior to admission: none    PT/OT recs: home with 24 hour Krishna Astudillo Notification (HEN): n/a    Barriers to discharge: none    Plan/comments: Spoke with patient at bedside. Several family members in the room. States she lives in a one story house with her son. She is independent with all activities with exception of driving. She states her son drives her. Discussed PT/OT rec's of home with 24 hour assist and both she and other family members present state that will not be a problem. Will follow for any further potential needs.

## 2018-08-20 NOTE — PROGRESS NOTES
Physical Therapy    Facility/Department: [facility] B3 - MED SURG  Initial Assessment    NAME: Donovan Campa  : 1928  MRN: [id number]    Date of Service: 2018    Discharge Recommendations:  24 hour supervision or assist        Patient Diagnosis(es): The primary encounter diagnosis was Transient cerebral ischemia, unspecified type. A diagnosis of Acute cystitis without hematuria was also pertinent to this visit. has a past medical history of A-fib ([address] Achilles tendon injury; Arthritis; Hyperlipidemia; Hypertension; Kidney insufficiency; MI, old; PONV (postoperative nausea and vomiting); Urinary frequency; and UTI (urinary tract infection). has a past surgical history that includes Coronary angioplasty with stent (); hip surgery (Left, 2016); Carpal tunnel release (Right, ); back surgery (); Hysterectomy (); and Achilles tendon surgery (2017).     Restrictions  Restrictions/Precautions: General Precautions  Other position/activity restrictions: medium fall risk per nsg  Vision/Hearing  Vision: Within Functional Limits  Hearing: Within functional limits     Subjective  Chart Reviewed: Yes  Patient assessed for rehabilitation services?: Yes  Referring Practitioner: Annie  Referral Date : 18  Diagnosis: TIA  Follows Commands: Within Functional Limits  Comments: RN cleared pt for therapy, pt up in chair on approach  Subjective: Pt agrees to therapy, denies pain  Pain Screening  Patient Currently in Pain: Denies    Orientation  Overall Orientation Status: Within Functional Limits    Social/Functional History  Social/Functional History  Lives With: Son (who works during the day; daughter comes at least once per week)  Type of Home: [facility]: One level, Performs ADL's on one level  Home Access: Stairs to enter with rails  Entrance Stairs - Number of Steps: 2  Bathroom Shower/Tub: Tub/Shower unit  Bathroom Toilet: Handicap height  Bathroom Equipment: Shower chair

## 2018-08-21 LAB
EKG ATRIAL RATE: 64 BPM
EKG DIAGNOSIS: NORMAL
EKG P AXIS: 79 DEGREES
EKG P-R INTERVAL: 250 MS
EKG Q-T INTERVAL: 394 MS
EKG QRS DURATION: 102 MS
EKG QTC CALCULATION (BAZETT): 406 MS
EKG R AXIS: -32 DEGREES
EKG T AXIS: 32 DEGREES
EKG VENTRICULAR RATE: 64 BPM
GLUCOSE BLD-MCNC: 172 MG/DL (ref 70–99)
ORGANISM: ABNORMAL
PERFORMED ON: ABNORMAL
REPORT: NORMAL
URINE CULTURE, ROUTINE: ABNORMAL
URINE CULTURE, ROUTINE: ABNORMAL

## 2018-08-23 LAB
BLOOD CULTURE, ROUTINE: ABNORMAL
ORGANISM: ABNORMAL
ORGANISM: ABNORMAL

## 2018-08-24 LAB — CULTURE, BLOOD 2: NORMAL

## 2018-09-18 PROBLEM — N39.0 UTI (URINARY TRACT INFECTION): Status: RESOLVED | Noted: 2018-08-19 | Resolved: 2018-09-18

## 2021-07-18 ENCOUNTER — APPOINTMENT (OUTPATIENT)
Dept: GENERAL RADIOLOGY | Age: 86
End: 2021-07-18
Payer: COMMERCIAL

## 2021-07-18 ENCOUNTER — HOSPITAL ENCOUNTER (OUTPATIENT)
Age: 86
Setting detail: OBSERVATION
Discharge: HOME | End: 2021-07-19
Attending: EMERGENCY MEDICINE | Admitting: INTERNAL MEDICINE
Payer: COMMERCIAL

## 2021-07-18 ENCOUNTER — APPOINTMENT (OUTPATIENT)
Dept: CT IMAGING | Age: 86
End: 2021-07-18
Payer: COMMERCIAL

## 2021-07-18 DIAGNOSIS — R07.9 CHEST PAIN, UNSPECIFIED TYPE: Primary | ICD-10-CM

## 2021-07-18 LAB
A/G RATIO: 1.2 (ref 1.1–2.2)
ALBUMIN SERPL-MCNC: 3.8 G/DL (ref 3.4–5)
ALP BLD-CCNC: 77 U/L (ref 40–129)
ALT SERPL-CCNC: 11 U/L (ref 10–40)
ANION GAP SERPL CALCULATED.3IONS-SCNC: 11 MMOL/L (ref 3–16)
AST SERPL-CCNC: 18 U/L (ref 15–37)
BASOPHILS ABSOLUTE: 0 K/UL (ref 0–0.2)
BASOPHILS RELATIVE PERCENT: 0.7 %
BILIRUB SERPL-MCNC: 0.3 MG/DL (ref 0–1)
BUN BLDV-MCNC: 30 MG/DL (ref 7–20)
CALCIUM SERPL-MCNC: 9.7 MG/DL (ref 8.3–10.6)
CHLORIDE BLD-SCNC: 105 MMOL/L (ref 99–110)
CO2: 25 MMOL/L (ref 21–32)
CREAT SERPL-MCNC: 1.7 MG/DL (ref 0.6–1.2)
D DIMER: 209 NG/ML DDU (ref 0–229)
EOSINOPHILS ABSOLUTE: 0.1 K/UL (ref 0–0.6)
EOSINOPHILS RELATIVE PERCENT: 1 %
GFR AFRICAN AMERICAN: 34
GFR NON-AFRICAN AMERICAN: 28
GLOBULIN: 3.1 G/DL
GLUCOSE BLD-MCNC: 118 MG/DL (ref 70–99)
HCT VFR BLD CALC: 33.2 % (ref 36–48)
HEMOGLOBIN: 11.4 G/DL (ref 12–16)
LYMPHOCYTES ABSOLUTE: 2.1 K/UL (ref 1–5.1)
LYMPHOCYTES RELATIVE PERCENT: 37.7 %
MCH RBC QN AUTO: 30.9 PG (ref 26–34)
MCHC RBC AUTO-ENTMCNC: 34.3 G/DL (ref 31–36)
MCV RBC AUTO: 90.1 FL (ref 80–100)
MONOCYTES ABSOLUTE: 0.8 K/UL (ref 0–1.3)
MONOCYTES RELATIVE PERCENT: 13.8 %
NEUTROPHILS ABSOLUTE: 2.6 K/UL (ref 1.7–7.7)
NEUTROPHILS RELATIVE PERCENT: 46.8 %
PDW BLD-RTO: 13.7 % (ref 12.4–15.4)
PLATELET # BLD: 188 K/UL (ref 135–450)
PMV BLD AUTO: 6.4 FL (ref 5–10.5)
POTASSIUM SERPL-SCNC: 4.1 MMOL/L (ref 3.5–5.1)
RBC # BLD: 3.68 M/UL (ref 4–5.2)
SODIUM BLD-SCNC: 141 MMOL/L (ref 136–145)
TOTAL PROTEIN: 6.9 G/DL (ref 6.4–8.2)
TROPONIN: <0.01 NG/ML
TROPONIN: <0.01 NG/ML
WBC # BLD: 5.6 K/UL (ref 4–11)

## 2021-07-18 PROCEDURE — 6370000000 HC RX 637 (ALT 250 FOR IP): Performed by: INTERNAL MEDICINE

## 2021-07-18 PROCEDURE — 85379 FIBRIN DEGRADATION QUANT: CPT

## 2021-07-18 PROCEDURE — G0378 HOSPITAL OBSERVATION PER HR: HCPCS

## 2021-07-18 PROCEDURE — 71045 X-RAY EXAM CHEST 1 VIEW: CPT

## 2021-07-18 PROCEDURE — 84484 ASSAY OF TROPONIN QUANT: CPT

## 2021-07-18 PROCEDURE — 36415 COLL VENOUS BLD VENIPUNCTURE: CPT

## 2021-07-18 PROCEDURE — 99285 EMERGENCY DEPT VISIT HI MDM: CPT

## 2021-07-18 PROCEDURE — 93005 ELECTROCARDIOGRAM TRACING: CPT | Performed by: EMERGENCY MEDICINE

## 2021-07-18 PROCEDURE — 6370000000 HC RX 637 (ALT 250 FOR IP): Performed by: NURSE PRACTITIONER

## 2021-07-18 PROCEDURE — 85025 COMPLETE CBC W/AUTO DIFF WBC: CPT

## 2021-07-18 PROCEDURE — 71250 CT THORAX DX C-: CPT

## 2021-07-18 PROCEDURE — 2580000003 HC RX 258: Performed by: INTERNAL MEDICINE

## 2021-07-18 PROCEDURE — 80053 COMPREHEN METABOLIC PANEL: CPT

## 2021-07-18 RX ORDER — POLYETHYLENE GLYCOL 3350 17 G/17G
17 POWDER, FOR SOLUTION ORAL DAILY PRN
Status: DISCONTINUED | OUTPATIENT
Start: 2021-07-18 | End: 2021-07-19 | Stop reason: HOSPADM

## 2021-07-18 RX ORDER — LISINOPRIL 20 MG/1
20 TABLET ORAL DAILY
Status: DISCONTINUED | OUTPATIENT
Start: 2021-07-19 | End: 2021-07-19 | Stop reason: HOSPADM

## 2021-07-18 RX ORDER — SODIUM CHLORIDE 0.9 % (FLUSH) 0.9 %
5-40 SYRINGE (ML) INJECTION EVERY 12 HOURS SCHEDULED
Status: DISCONTINUED | OUTPATIENT
Start: 2021-07-18 | End: 2021-07-19 | Stop reason: HOSPADM

## 2021-07-18 RX ORDER — ACETAMINOPHEN 650 MG/1
650 SUPPOSITORY RECTAL EVERY 6 HOURS PRN
Status: DISCONTINUED | OUTPATIENT
Start: 2021-07-18 | End: 2021-07-19 | Stop reason: HOSPADM

## 2021-07-18 RX ORDER — ASPIRIN 81 MG/1
81 TABLET, CHEWABLE ORAL DAILY
Status: DISCONTINUED | OUTPATIENT
Start: 2021-07-19 | End: 2021-07-19 | Stop reason: HOSPADM

## 2021-07-18 RX ORDER — AMLODIPINE BESYLATE 5 MG/1
5 TABLET ORAL DAILY
Status: ON HOLD | COMMUNITY
Start: 2021-06-08 | End: 2022-10-15

## 2021-07-18 RX ORDER — NITROGLYCERIN 0.4 MG/1
0.4 TABLET SUBLINGUAL EVERY 5 MIN PRN
Status: DISCONTINUED | OUTPATIENT
Start: 2021-07-18 | End: 2021-07-19 | Stop reason: HOSPADM

## 2021-07-18 RX ORDER — ACETAMINOPHEN 325 MG/1
650 TABLET ORAL EVERY 6 HOURS PRN
Status: DISCONTINUED | OUTPATIENT
Start: 2021-07-18 | End: 2021-07-19 | Stop reason: HOSPADM

## 2021-07-18 RX ORDER — ROSUVASTATIN CALCIUM 10 MG/1
10 TABLET, COATED ORAL NIGHTLY
Status: DISCONTINUED | OUTPATIENT
Start: 2021-07-18 | End: 2021-07-19 | Stop reason: HOSPADM

## 2021-07-18 RX ORDER — METOPROLOL SUCCINATE 25 MG/1
25 TABLET, EXTENDED RELEASE ORAL 2 TIMES DAILY
Status: DISCONTINUED | OUTPATIENT
Start: 2021-07-18 | End: 2021-07-19 | Stop reason: HOSPADM

## 2021-07-18 RX ORDER — SODIUM CHLORIDE 0.9 % (FLUSH) 0.9 %
5-40 SYRINGE (ML) INJECTION PRN
Status: DISCONTINUED | OUTPATIENT
Start: 2021-07-18 | End: 2021-07-19 | Stop reason: HOSPADM

## 2021-07-18 RX ORDER — ONDANSETRON 4 MG/1
4 TABLET, ORALLY DISINTEGRATING ORAL EVERY 8 HOURS PRN
Status: DISCONTINUED | OUTPATIENT
Start: 2021-07-18 | End: 2021-07-19 | Stop reason: HOSPADM

## 2021-07-18 RX ORDER — HYDROCODONE BITARTRATE AND ACETAMINOPHEN 5; 325 MG/1; MG/1
1 TABLET ORAL ONCE
Status: COMPLETED | OUTPATIENT
Start: 2021-07-18 | End: 2021-07-18

## 2021-07-18 RX ORDER — ONDANSETRON 2 MG/ML
4 INJECTION INTRAMUSCULAR; INTRAVENOUS EVERY 6 HOURS PRN
Status: DISCONTINUED | OUTPATIENT
Start: 2021-07-18 | End: 2021-07-19 | Stop reason: HOSPADM

## 2021-07-18 RX ORDER — OXYBUTYNIN CHLORIDE 5 MG/1
5 TABLET, EXTENDED RELEASE ORAL DAILY
Status: DISCONTINUED | OUTPATIENT
Start: 2021-07-19 | End: 2021-07-19 | Stop reason: HOSPADM

## 2021-07-18 RX ORDER — SODIUM CHLORIDE 9 MG/ML
25 INJECTION, SOLUTION INTRAVENOUS PRN
Status: DISCONTINUED | OUTPATIENT
Start: 2021-07-18 | End: 2021-07-19 | Stop reason: HOSPADM

## 2021-07-18 RX ADMIN — METOPROLOL SUCCINATE 25 MG: 25 TABLET, FILM COATED, EXTENDED RELEASE ORAL at 22:15

## 2021-07-18 RX ADMIN — HYDROCODONE BITARTRATE AND ACETAMINOPHEN 1 TABLET: 5; 325 TABLET ORAL at 17:10

## 2021-07-18 RX ADMIN — Medication 10 ML: at 22:16

## 2021-07-18 RX ADMIN — APIXABAN 2.5 MG: 5 TABLET, FILM COATED ORAL at 22:15

## 2021-07-18 ASSESSMENT — PAIN DESCRIPTION - ORIENTATION
ORIENTATION: LEFT
ORIENTATION: LEFT

## 2021-07-18 ASSESSMENT — PAIN SCALES - GENERAL
PAINLEVEL_OUTOF10: 8
PAINLEVEL_OUTOF10: 7
PAINLEVEL_OUTOF10: 8

## 2021-07-18 ASSESSMENT — PAIN DESCRIPTION - PROGRESSION: CLINICAL_PROGRESSION: NOT CHANGED

## 2021-07-18 ASSESSMENT — PAIN DESCRIPTION - PAIN TYPE
TYPE: ACUTE PAIN
TYPE: ACUTE PAIN

## 2021-07-18 ASSESSMENT — PAIN DESCRIPTION - LOCATION
LOCATION: CHEST
LOCATION: CHEST

## 2021-07-18 ASSESSMENT — PAIN DESCRIPTION - FREQUENCY: FREQUENCY: INTERMITTENT

## 2021-07-18 ASSESSMENT — HEART SCORE: ECG: 0

## 2021-07-18 ASSESSMENT — PAIN DESCRIPTION - DESCRIPTORS: DESCRIPTORS: ACHING

## 2021-07-18 NOTE — ED NOTES
Dr. Rosalva Healy at bedside      Nick Anguiano, On license of [facility]  07/18/21 1921

## 2021-07-18 NOTE — ED PROVIDER NOTES
I independently performed a history and physical on Lubna Mobley. All diagnostic, treatment, and disposition decisions were made by myself in conjunction with the advanced practice provider. For further details of [facility] emergency department encounter, please see the ASHLEY/resident's documentation. Briefly, this is a 28-year-old female with a past history hyperlipidemia, hypertension, A. fib, CAD who presents emergency department for evaluation of chest pain. Patient states that she has had left-sided chest pain for the past 1 day. It does seem to be worse with movement. States she has pain with taking a deep breath. Denies any falls or injuries. On exam, pain is reproducible to the left lateral chest wall. There is no no overlying skin changes. ED evaluation will include basic labs, cardiac panel. Pain seems to be reproducible musculoskeletal nature however because of her age and risk factors she is moderately elevated heart score and have recommended admission for further risk stratification. Heart score is moderately elevated (due to age, greater than 3 risk factors. EKG  The Ekg interpreted by myself  normal sinus rhythm with a rate of 60, 1st degree AV block  Axis is   left  QTc is  normal  Intervals and Durations are unremarkable. No specific ST-T wave changes appreciated. No evidence of acute ischemia.    No significant change from prior EKG dated 8/19/18       Jayna Cutler MD  07/18/21 [id number]

## 2021-07-18 NOTE — ED NOTES
Medicated per order. Reporting pain in left calm with palpation. States have left sided chest pain under the breast area, denied injury repositioned for comfort.  Denied further needs      Billkristan Cleaning RN  07/18/21 9301

## 2021-07-18 NOTE — ED PROVIDER NOTES
EMERGENCY DEPARTMENT ENCOUNTER      This patient was seen and evaluated by the attending physician. Pt Name: Miguel Angel Morgan  MRN: [id number]  Chloégfchasity 2/1/1928  Date of evaluation: 7/18/2021  Provider: MICHELLE Diaz - CNP-C  PCP: Kristin Brown MD  ED Attending: Sridevi Amaya MD    History provided by the patient    CHIEF COMPLAINT:     Chief Complaint   Patient presents with    Chest Pain     patient c/o chest pain that started \"a couple days ago\". Unable to take a deep breath today without pain. HISTORY OF PRESENT ILLNESS:      Mgiuel Angel Morgan is a 80 y.o. female who presents [facility]  ED with complaints of left-sided chest pain has been going on for several days, states that it hurts when she takes a deep breath. Patient denies any trauma. States that her pain is worse when she tries to move, twist or pushes on her chest.  She denies any abdominal pain, nausea or vomiting. No cuco shortness of breath. Denies any significant chest pain while not breathing or moving. She is here for further evaluation. Location:chest  Quality:ache  Severity:7  Duration:3 days  Modifying factors:worse with movement or palpation    Nursing Notes were reviewed     REVIEW OF SYSTEMS:     Review of Systems  All systems, atotal of 10, are reviewed and negative except for those that were just noted in history present illness.         PAST MEDICAL HISTORY:     Past Medical History:   Diagnosis Date    A-fib [facility])     Achilles tendon injury 8/23/2016    Arthritis     Hyperlipidemia     Hypertension     Kidney insufficiency     MI, old     PONV (postoperative nausea and vomiting)     Urinary frequency     UTI (urinary tract infection) 8/19/2018         SURGICAL HISTORY:      Past Surgical History:   Procedure Laterality Date    ACHILLES TENDON SURGERY  03/02/2017    R achilles tendon debridement     BACK SURGERY  2003    back surgery x3; lower back    CARPAL TUNNEL RELEASE Right 2002    CORONARY ANGIOPLASTY WITH STENT PLACEMENT  2006    HIP SURGERY Left 1/21/2016    hip pinning    HYSTERECTOMY  1965         CURRENT MEDICATIONS:       Previous Medications    ACETAMINOPHEN 650 MG TABS    Take 650 mg by mouth every 4 hours as needed    CRESTOR 10 MG TABLET    TK 1 T PO QOD (pt states she takes 3-4 tabs per week)    ELIQUIS 2.5 MG TABS TABLET    TK 1 T PO BID    LISINOPRIL (PRINIVIL;ZESTRIL) 20 MG TABLET    Take 20 mg by mouth daily    METOPROLOL SUCCINATE (TOPROL XL) 25 MG EXTENDED RELEASE TABLET    Take 25 mg by mouth 2 times daily    MULTIPLE VITAMINS-MINERALS (THERAPEUTIC MULTIVITAMIN-MINERALS) TABLET    Take 1 tablet by mouth daily    OXYBUTYNIN (DITROPAN XL) 5 MG EXTENDED RELEASE TABLET    Take 1 tablet by mouth daily         ALLERGIES:    Epinephrine and Other    FAMILY HISTORY:     No family history on file. SOCIAL HISTORY:       Social History     Socioeconomic History    Marital status:      Spouse name: Not on file    Number of children: Not on file    Years of education: Not on file    Highest education level: Not on file   Occupational History    Not on file   Tobacco Use    Smoking status: Never Smoker    Smokeless tobacco: Never Used   Vaping Use    Vaping Use: Never used   Substance and Sexual Activity    Alcohol use: No    Drug use: No    Sexual activity: Never   Other Topics Concern    Not on file   Social History Narrative    Not on file     Social Determinants of Health     Financial Resource Strain:     Difficulty of Paying Living Expenses:    Food Insecurity:     Worried About Running Out of Food in the Last Year:     [address] in the Last Year:    Transportation Needs:     Lack of Transportation (Medical):      Lack of Transportation (Non-Medical):    Physical Activity:     Days of Exercise per Week:     Minutes of Exercise per Session:    Stress:     Feeling of Stress :    Social Connections:     Frequency of Communication with Friends and Family:     Frequency of Social Gatherings with Friends and Family:     Attends Church Services:     Active Member of Clubs or Organizations:     Attends Club or Organization Meetings:     Marital Status:    Intimate Partner Violence:     Fear of Current or Ex-Partner:     Emotionally Abused:     Physically Abused:     Sexually Abused:        SCREENINGS:   [place] Coma Scale  Eye Opening: Spontaneous  Best Verbal Response: Oriented  Best Motor Response: Obeys commands  Dakota Coma Scale Score: 15 Heart Score for chest pain patients  History: Slightly Suspicious  ECG: Normal  Patient Age: > 65 years  Risk Factors: > 3 Risk factors or history of atherosclerotic disease*  Troponin: < 1X normal limit  Heart Score Total: 4      PHYSICAL EXAM:       ED Triage Vitals   BP Temp Temp Source Pulse Resp SpO2 Height Weight   07/18/21 1645 07/18/21 1645 07/18/21 1645 07/18/21 1645 07/18/21 1645 07/18/21 1645 07/18/21 1638 07/18/21 1638   (!) 169/50 98.8 °F (37.1 °C) Oral 59 16 99 % 5' 6\" (1.676 m) 145 lb (65.8 kg)       Physical Exam    CONSTITUTIONAL: Awake and alert. Cooperative. Well-developed. Well-nourished. Vitals:    07/18/21 1638 07/18/21 1645 07/18/21 1715 07/18/21 1845   BP:  (!) 169/50 (!) 153/51 (!) 140/54   Pulse:  59 59 60   Resp:  16 10 12   Temp:  98.8 °F (37.1 °C)     TempSrc:  Oral     SpO2:  99% 100% 97%   Weight: 145 lb (65.8 kg)      Height: 5' 6\" (1.676 m)        HENT: Normocephalic. Atraumatic. External ears normal, without discharge. TMs clear bilaterally. No nasal discharge. Oropharynx clear, no erythema. Mucous membranes moist.  EYES: Conjunctiva non-injected, no lid abnormalities noted. No scleral icterus. PERRL. EOM's grossly intact. Anterior chambers clear. NECK: Supple. Normal ROM. No meningismus. No thyroid tenderness or swelling noted. CARDIOVASCULAR: RRR. No Murmer. PULMONARY/CHEST WALL: Effort normal. No tachypnea. there is tenderness to the left anterior rib cage without crepitus. Pain is reproducible, same pain that she is describing that brought her into the ED. ABDOMEN: Normal BS. Soft. Nondistended. No tenderness to palpate. No guarding. No hernias noted. No splenomegaly. Back: Spine is midline. No ecchymosis. No crepitus on palpation. No obvious subluxation of vertebral column. No saddle anesthesia or evidence of cauda equina. /ANORECTAL: Not assessed  MUSKULOSKELETAL: Normal ROM. No acute deformities. No edema. No tenderness to palpate. SKIN: Warm and dry. NEUROLOGICAL:  GCS 15. CN II-XII grossly intact. Strength is 5/5 in allextremities and sensation is intact. PSYCHIATRIC: Normal affect, normal insight and judgement. Alert andoriented x 3.         DIAGNOSTIC RESULTS:     LABS:    Results for orders placed or performed during the hospital encounter of 07/18/21   CBC Auto Differential   Result Value Ref Range    WBC 5.6 4.0 - 11.0 K/uL    RBC 3.68 (L) 4.00 - 5.20 M/uL    Hemoglobin 11.4 (L) 12.0 - 16.0 g/dL    Hematocrit 33.2 (L) 36.0 - 48.0 %    MCV 90.1 80.0 - 100.0 fL    MCH 30.9 26.0 - 34.0 pg    MCHC 34.3 31.0 - 36.0 g/dL    RDW 13.7 12.4 - 15.4 %    Platelets 616 926 - 214 K/uL    MPV 6.4 5.0 - 10.5 fL    Neutrophils % 46.8 %    Lymphocytes % 37.7 %    Monocytes % 13.8 %    Eosinophils % 1.0 %    Basophils % 0.7 %    Neutrophils Absolute 2.6 1.7 - 7.7 K/uL    Lymphocytes Absolute 2.1 1.0 - 5.1 K/uL    Monocytes Absolute 0.8 0.0 - 1.3 K/uL    Eosinophils Absolute 0.1 0.0 - 0.6 K/uL    Basophils Absolute 0.0 0.0 - 0.2 K/uL   Comprehensive metabolic panel   Result Value Ref Range    Sodium 141 136 - 145 mmol/L    Potassium 4.1 3.5 - 5.1 mmol/L    Chloride 105 99 - 110 mmol/L    CO2 25 21 - 32 mmol/L    Anion Gap 11 3 - 16    Glucose 118 (H) 70 - 99 mg/dL    BUN 30 (H) 7 - 20 mg/dL    CREATININE 1.7 (H) 0.6 - 1.2 mg/dL    GFR Non-African American 28 (A) >60    GFR  34 (A) >60    Calcium 9.7 8.3 - 10.6 mg/dL    Total Protein 6.9 6.4 - 8.2 g/dL    Albumin 3.8 3.4 - 5.0 g/dL    Albumin/Globulin Ratio 1.2 1.1 - 2.2    Total Bilirubin 0.3 0.0 - 1.0 mg/dL    Alkaline Phosphatase 77 40 - 129 U/L    ALT 11 10 - 40 U/L    AST 18 15 - 37 U/L    Globulin 3.1 g/dL   Troponin   Result Value Ref Range    Troponin <0.01 <0.01 ng/mL         RADIOLOGY:  All x-ray studies are viewed/reviewedby me. Formal interpretations per the radiologist are as follows:      XR CHEST PORTABLE   Final Result   No airspace disease by radiograph. EKG:  See EKG interpretation by an attending phsyician      PROCEDURES:   N/A    CRITICAL CARE TIME:   N/A    CONSULTS:  None      EMERGENCYDEPARTMENT COURSE and DIFFERENTIAL DIAGNOSIS/MDM:   Vitals:    Vitals:    07/18/21 1638 07/18/21 1645 07/18/21 1715 07/18/21 1845   BP:  (!) 169/50 (!) 153/51 (!) 140/54   Pulse:  59 59 60   Resp:  16 10 12   Temp:  98.8 °F (37.1 °C)     TempSrc:  Oral     SpO2:  99% 100% 97%   Weight: 145 lb (65.8 kg)      Height: 5' 6\" (1.676 m)          Patient was given the following medications:  Medications   HYDROcodone-acetaminophen (NORCO) 5-325 MG per tablet 1 tablet (1 tablet Oral Given 7/18/21 1710)         Patient was evaluated by both myself and Guera Ackerman MD.    Patient presented to the emergency room today with complaints of chest pain. Patient pain was reproducible with palpation, worse with movement. EKG was nonischemic, troponin negative, steadily increasing creatinine level is only up from 1.5-1.7 however over the last several years it looks like it steadily increasing. Patient vital signs are stable, I did have the attending physician evaluate the patient, patient had a heart score 4 so we did feel the patient required admission to the hospital for evaluation. Patient was given a Eckerman here in the ED. She agreed to admission. Patient laboratory studies, radiographic imaging, andassessment were all discussed with the patient and/or patient family. There was shared decision-making between myself, the attending physician, as well as the patient and/or their surrogate and we are all in agreement with admission. There was an opportunity for questions and all questions were answered to the best of my ability and to the satisfaction of the patient and/or patient family. FINAL IMPRESSION:      1. Chest pain, unspecified type          DISPOSITION/PLAN:   DISPOSITION      PATIENT REFERRED TO:  No follow-up provider specified.     DISCHARGE MEDICATIONS:  New Prescriptions    No medications on file                  (Please note that portions of this note were completed with a voice recognition program.  Efforts were made to edit the dictations, but occasionally words are mis-transcribed.)    MICHELLE Cox CNP-C (electronically signed)        MICHELLE Cox CNP  07/19/21 4520

## 2021-07-18 NOTE — ED NOTES
Family at desk inquiring about status, spoke with provider, possible admission to come see patient. Patient's son updated on potential admission.  Patient resting in bed reporting pain remains present worse with movement      Eddie Yen RN  07/18/21 0504

## 2021-07-19 ENCOUNTER — APPOINTMENT (OUTPATIENT)
Dept: VASCULAR LAB | Age: 86
End: 2021-07-19
Payer: COMMERCIAL

## 2021-07-19 ENCOUNTER — APPOINTMENT (OUTPATIENT)
Dept: NUCLEAR MEDICINE | Age: 86
End: 2021-07-19
Payer: COMMERCIAL

## 2021-07-19 VITALS
RESPIRATION RATE: 16 BRPM | BODY MASS INDEX: 24.23 KG/M2 | DIASTOLIC BLOOD PRESSURE: 52 MMHG | HEIGHT: 66 IN | HEART RATE: 52 BPM | WEIGHT: 150.8 LBS | OXYGEN SATURATION: 97 % | SYSTOLIC BLOOD PRESSURE: 132 MMHG | TEMPERATURE: 97.8 F

## 2021-07-19 LAB
CHOLESTEROL, TOTAL: 245 MG/DL (ref 0–199)
EKG ATRIAL RATE: 55 BPM
EKG ATRIAL RATE: 60 BPM
EKG DIAGNOSIS: NORMAL
EKG DIAGNOSIS: NORMAL
EKG P AXIS: 68 DEGREES
EKG P AXIS: 78 DEGREES
EKG P-R INTERVAL: 298 MS
EKG P-R INTERVAL: 320 MS
EKG Q-T INTERVAL: 398 MS
EKG Q-T INTERVAL: 434 MS
EKG QRS DURATION: 100 MS
EKG QRS DURATION: 96 MS
EKG QTC CALCULATION (BAZETT): 398 MS
EKG QTC CALCULATION (BAZETT): 415 MS
EKG R AXIS: -23 DEGREES
EKG R AXIS: -42 DEGREES
EKG T AXIS: 22 DEGREES
EKG T AXIS: 31 DEGREES
EKG VENTRICULAR RATE: 55 BPM
EKG VENTRICULAR RATE: 60 BPM
HCT VFR BLD CALC: 40.6 % (ref 36–48)
HDLC SERPL-MCNC: 66 MG/DL (ref 40–60)
HEMOGLOBIN: 13.6 G/DL (ref 12–16)
LDL CHOLESTEROL CALCULATED: 158 MG/DL
LV EF: 60 %
LVEF MODALITY: NORMAL
MCH RBC QN AUTO: 30.8 PG (ref 26–34)
MCHC RBC AUTO-ENTMCNC: 33.5 G/DL (ref 31–36)
MCV RBC AUTO: 91.9 FL (ref 80–100)
PDW BLD-RTO: 14 % (ref 12.4–15.4)
PLATELET # BLD: 216 K/UL (ref 135–450)
PMV BLD AUTO: 6.3 FL (ref 5–10.5)
RBC # BLD: 4.42 M/UL (ref 4–5.2)
TRIGL SERPL-MCNC: 107 MG/DL (ref 0–150)
TROPONIN: <0.01 NG/ML
VLDLC SERPL CALC-MCNC: 21 MG/DL
WBC # BLD: 5.8 K/UL (ref 4–11)

## 2021-07-19 PROCEDURE — 93017 CV STRESS TEST TRACING ONLY: CPT

## 2021-07-19 PROCEDURE — G0378 HOSPITAL OBSERVATION PER HR: HCPCS

## 2021-07-19 PROCEDURE — 6360000002 HC RX W HCPCS: Performed by: INTERNAL MEDICINE

## 2021-07-19 PROCEDURE — 93010 ELECTROCARDIOGRAM REPORT: CPT | Performed by: INTERNAL MEDICINE

## 2021-07-19 PROCEDURE — 85027 COMPLETE CBC AUTOMATED: CPT

## 2021-07-19 PROCEDURE — 36415 COLL VENOUS BLD VENIPUNCTURE: CPT

## 2021-07-19 PROCEDURE — 93971 EXTREMITY STUDY: CPT

## 2021-07-19 PROCEDURE — 3430000000 HC RX DIAGNOSTIC RADIOPHARMACEUTICAL: Performed by: INTERNAL MEDICINE

## 2021-07-19 PROCEDURE — A9502 TC99M TETROFOSMIN: HCPCS | Performed by: INTERNAL MEDICINE

## 2021-07-19 PROCEDURE — 80061 LIPID PANEL: CPT

## 2021-07-19 PROCEDURE — 78452 HT MUSCLE IMAGE SPECT MULT: CPT

## 2021-07-19 PROCEDURE — 84484 ASSAY OF TROPONIN QUANT: CPT

## 2021-07-19 PROCEDURE — 93005 ELECTROCARDIOGRAM TRACING: CPT | Performed by: INTERNAL MEDICINE

## 2021-07-19 RX ADMIN — TETROFOSMIN 30.6 MILLICURIE: 1.38 INJECTION, POWDER, LYOPHILIZED, FOR SOLUTION INTRAVENOUS at 11:22

## 2021-07-19 RX ADMIN — TETROFOSMIN 9.5 MILLICURIE: 1.38 INJECTION, POWDER, LYOPHILIZED, FOR SOLUTION INTRAVENOUS at 10:14

## 2021-07-19 RX ADMIN — REGADENOSON 0.4 MG: 0.08 INJECTION, SOLUTION INTRAVENOUS at 11:22

## 2021-07-19 ASSESSMENT — PAIN DESCRIPTION - PROGRESSION: CLINICAL_PROGRESSION: NOT CHANGED

## 2021-07-19 ASSESSMENT — PAIN SCALES - GENERAL: PAINLEVEL_OUTOF10: 2

## 2021-07-19 NOTE — ED NOTES
Patient to CT scan admitting team was here to et returned once patient returned to room, vs as charted, report at bedside to Dennie Cox.  patient alert and oriented respirations easy and unlabored intermittent pain continues      [facility], [address] [place] [address]  07/18/21 4807

## 2021-07-19 NOTE — PROGRESS NOTES
Patient admitted to room 211 from ER RM 10. Patient oriented to room, call light, bed rails, phone, lights and bathroom. Patient instructed about the schedule of the day including: vital sign frequency, lab draws, possible tests, frequency of MD and staff rounds, including RN/MD rounding together at bedside, daily weights, and I &O's. Patient instructed about prescribed diet, how to use 8MENU, and television. bed alarm in place, patient aware of placement and reason. Telemetry box #28 in place, patient aware of placement and reason. Confirmed pt name and tele box number with [facility]. Bed locked, in lowest position, side rails up 2/4, call light within reach. Will continue to monitor.

## 2021-07-19 NOTE — H&P
Hospital Medicine History & Physical      PCP: Erica Parra MD    Date of Admission: 7/18/2021    Date of Service: Pt seen/examined on 7/18/2021 and  Placed in Observation. Chief Complaint: Chest pain for last couple of days      History Of Present Illness:    80 y.o. female who presented to [facility] with above complaint. She has left sided chest pain, inframammary more with respiration no radiation not associated with shortness of breath dizziness or syncope. She does not have fever sputum nausea vomiting no abdominal pain. She has left lower extremity pain for last couple of days. She is pretty independent and walk around without any help. She denies any trauma to chest.  No history of long travel    Past Medical History:          Diagnosis Date    A-fib [facility])     Achilles tendon injury 8/23/2016    Arthritis     Hyperlipidemia     Hypertension     Kidney insufficiency     MI, old     PONV (postoperative nausea and vomiting)     Urinary frequency     UTI (urinary tract infection) 8/19/2018       Past Surgical History:          Procedure Laterality Date    ACHILLES TENDON SURGERY  03/02/2017    R achilles tendon debridement     BACK SURGERY  2003    back surgery x3; lower back    CARPAL TUNNEL RELEASE Right 2002    CORONARY ANGIOPLASTY WITH STENT PLACEMENT  2006    HIP SURGERY Left 1/21/2016    hip pinning    HYSTERECTOMY  1965       Medications Prior to Admission:      Prior to Admission medications    Medication Sig Start Date End Date Taking?  Authorizing Provider   oxybutynin (DITROPAN XL) 5 MG extended release tablet Take 1 tablet by mouth daily 3/11/17   Fadumo Dent APRN - CNP   metoprolol succinate (TOPROL XL) 25 MG extended release tablet Take 25 mg by mouth 2 times daily    Historical Provider, MD   Multiple Vitamins-Minerals (THERAPEUTIC MULTIVITAMIN-MINERALS) tablet Take 1 tablet by mouth daily    Historical Provider, MD MUNOZ 2.5 MG TABS tablet TK 1 T PO BID 8/13/16   Historical Provider, MD   CRESTOR 10 MG tablet TK 1 T PO QOD (pt states she takes 3-4 tabs per week) 8/13/16   Historical Provider, MD   acetaminophen 650 MG TABS Take 650 mg by mouth every 4 hours as needed 1/24/16   CHRISTIANE Casillas   lisinopril (PRINIVIL;ZESTRIL) 20 MG tablet Take 20 mg by mouth daily    Historical Provider, MD       Allergies:  Epinephrine and Other    Social History:      The patient currently lives at home    TOBACCO:   reports that she has never smoked. She has never used smokeless tobacco.  ETOH:   reports no history of alcohol use. E-Cigarettes/Vaping Use     Questions Responses    E-Cigarette/Vaping Use Never User    Start Date     Passive Exposure     Quit Date     Counseling Given     Comments             Family History:       Reviewed in detail and negative for DM, CAD, Cancer, CVA. Positive as follows:    No family history on file. REVIEW OF SYSTEMS COMPLETED:   Pertinent positives as noted in the HPI. All other systems reviewed and negative. PHYSICAL EXAM PERFORMED:    BP (!) 151/58   Pulse 63   Temp 98.8 °F (37.1 °C) (Oral)   Resp 14   Ht 5' 6\" (1.676 m)   Wt 145 lb (65.8 kg)   SpO2 98%   BMI 23.40 kg/m²     General appearance:  No apparent distress, appears stated age and cooperative. HEENT:  Normal cephalic, atraumatic without obvious deformity. Pupils equal, round, and reactive to light. Extra ocular muscles intact. Conjunctivae/corneas clear. Neck: Supple, with full range of motion. No jugular venous distention. Trachea midline. Respiratory:  Normal respiratory effort. Clear to auscultation, bilaterally without Rales/Wheezes/Rhonchi. No tenderness over left-sided ribs  Cardiovascular:  Regular rate and rhythm with normal S1/S2 with murmurs, rubs or gallops. Abdomen: Soft, non-tender, non-distended with normal bowel sounds. Musculoskeletal:  No clubbing, cyanosis or edema bilaterally. Full range of motion without deformity.   Mild tenderness consider stress test if all work-up negative  Aspirin, statin, beta-blocker, nitro as needed Tylenol as needed    Chronic kidney disease stage III-creatinine 1.7, stable monitor    History of atrial fibrillation-currently on sinus rhythm, on beta-blocker and Eliquis    Hypertension-metoprolol and lisinopril        DVT Prophylaxis: On Eliquis  Diet: Cardiac, n.p.o. midnight   code Status: Full code    PT/OT Eval Status:     Dispo -admitted to telemetry observation       Teddy Villanueva MD    Thank you Argenis Ro MD for the opportunity to be involved in this patient's care. If you have any questions or concerns please feel free to contact me at [phone].

## 2021-07-19 NOTE — DISCHARGE SUMMARY
Hospital Medicine Discharge Summary    Patient ID: You Almeida      Patient's PCP: Alba Aldana MD    Admit Date: 7/18/2021     Discharge Date: 7/19/2021      Admitting Physician: Kayla Roa MD     Discharge Physician: Kayla Roa MD     Discharge Diagnoses: Active Hospital Problems    Diagnosis     Chest pain [R07.9]        The patient was seen and examined on day of discharge and this discharge summary is in conjunction with any daily progress note from day of discharge. Hospital Course:   Chest pain-pleuritic-? No pneumonia PE or ACS  Currently pain-free     Chronic kidney disease stage III-creatinine 1.7, stable monitor     History of atrial fibrillation-currently on sinus rhythm, on beta-blocker and Eliquis     Hypertension-metoprolol and lisinopril             Physical Exam Performed:     BP (!) 132/52   Pulse 52   Temp 97.8 °F (36.6 °C) (Oral)   Resp 16   Ht 5' 6\" (1.676 m)   Wt 150 lb 12.8 oz (68.4 kg)   SpO2 97%   BMI 24.34 kg/m²       General appearance:  No apparent distress, appears stated age and cooperative. HEENT:  Normal cephalic, atraumatic without obvious deformity. Pupils equal, round, and reactive to light. Extra ocular muscles intact. Conjunctivae/corneas clear. Neck: Supple, with full range of motion. No jugular venous distention. Trachea midline. Respiratory:  Normal respiratory effort. Clear to auscultation, bilaterally without Rales/Wheezes/Rhonchi. Cardiovascular:  Regular rate and rhythm with normal S1/S2 without murmurs, rubs or gallops. Abdomen: Soft, non-tender, non-distended with normal bowel sounds. Musculoskeletal:  No clubbing, cyanosis or edema bilaterally. Full range of motion without deformity. Skin: Skin color, texture, turgor normal.  No rashes or lesions. Neurologic:  Neurovascularly intact without any focal sensory/motor deficits.  Cranial nerves: II-XII intact, grossly non-focal.  Psychiatric:  Alert and oriented, thought content appropriate, normal insight  Capillary Refill: Brisk,< 3 seconds   Peripheral Pulses: +2 palpable, equal bilaterally       Labs: For convenience and continuity at follow-up the following most recent labs are provided:      CBC:    Lab Results   Component Value Date    WBC 5.8 07/19/2021    HGB 13.6 07/19/2021    HCT 40.6 07/19/2021     07/19/2021       Renal:    Lab Results   Component Value Date     07/18/2021    K 4.1 07/18/2021    K 4.3 08/20/2018     07/18/2021    CO2 25 07/18/2021    BUN 30 07/18/2021    CREATININE 1.7 07/18/2021    CALCIUM 9.7 07/18/2021         Significant Diagnostic Studies    Radiology:   NM Cardiac Stress Test Nuclear Imaging   Final Result      VL Extremity Venous Left   Final Result      CT CHEST WO CONTRAST   Final Result   1. Diffuse interlobular septal thickening. Correlate with presentation to   exclude interstitial edema or fluid overload. 2. Scattered subpleural atelectasis. No airspace disease at this time. 3. Other findings as described. XR CHEST PORTABLE   Final Result   No airspace disease by radiograph.                 Consults:     None    Disposition: Home  Condition at Discharge: Stable    Discharge Instructions/Follow-up: PCP    Code Status:  Prior    Activity: activity as tolerated    Diet: cardiac diet      Discharge Medications:     Discharge Medication List as of 7/19/2021  3:48 PM           Details   amLODIPine (NORVASC) 5 MG tablet Take 5 mg by mouth dailyHistorical Med      oxybutynin (DITROPAN XL) 5 MG extended release tablet Take 1 tablet by mouth daily, Disp-7 tablet, R-0Print      metoprolol succinate (TOPROL XL) 25 MG extended release tablet Take 25 mg by mouth 2 times dailyHistorical Med      Multiple Vitamins-Minerals (THERAPEUTIC MULTIVITAMIN-MINERALS) tablet Take 1 tablet by mouth daily      ELIQUIS 2.5 MG TABS tablet TK 1 T PO BID, R-6, JOSE      CRESTOR 10 MG tablet TK 1 T PO QOD (pt states she takes 3-4 tabs per week), R-2, JOSE      acetaminophen 650 MG TABS Take 650 mg by mouth every 4 hours as needed, Disp-120 tablet, R-0      lisinopril (PRINIVIL;ZESTRIL) 20 MG tablet Take 20 mg by mouth daily             Time Spent on discharge is   30 minutes in the examination, evaluation, counseling and review of medications and discharge plan. Signed:    Brian Montanez MD   7/21/2021      Thank you Yuko Olivares MD for the opportunity to be involved in this patient's care. If you have any questions or concerns please feel free to contact me at [phone].

## 2021-07-19 NOTE — ED NOTES
Resting in bed calm reporting no needs.  awaiting IP bed assignment      Macey Cleaning RN  07/18/21 2005

## 2021-07-19 NOTE — CARE COORDINATION
CM went to bedside to discuss discharge plans. Pt states she is returning home with her son. Declines any needs at this time.   Rosangela Mayo RN

## 2021-07-19 NOTE — PROGRESS NOTES
4 Eyes Skin Assessment     The patient is being assess for  Admission    I agree that 2 RN's have performed a thorough Head to Toe Skin Assessment on the patient. ALL assessment sites listed below have been assessed. Areas assessed by both nurses:   [x]   Head, Face, and Ears   [x]   Shoulders, Back, and Chest  [x]   Arms, Elbows, and Hands   [x]   Coccyx, Sacrum, and Ischum  [x]   Legs, Feet, and Heels        Does the Patient have Skin Breakdown?   No         Zane Prevention initiated:  NA   Wound Care Orders initiated:  NA      [facility] nurse consulted for Pressure Injury (Stage 3,4, Unstageable, DTI, NWPT, and Complex wounds):  NA      Nurse 1 eSignature: Electronically signed by Jeffery Barone RN on 7/18/21 at 9:45 PM EDT    **SHARE this note so that the co-signing nurse is able to place an eSignature**    Nurse 2 eSignature: {Esignature:[id number]}

## 2021-11-04 ENCOUNTER — HOSPITAL ENCOUNTER (OUTPATIENT)
Dept: GENERAL RADIOLOGY | Age: 86
Discharge: HOME | End: 2021-11-04
Payer: COMMERCIAL

## 2021-11-04 DIAGNOSIS — N18.4 CHRONIC KIDNEY DISEASE, STAGE IV (SEVERE) (HCC): ICD-10-CM

## 2021-11-04 PROCEDURE — 74018 RADEX ABDOMEN 1 VIEW: CPT

## 2022-10-14 ENCOUNTER — APPOINTMENT (OUTPATIENT)
Dept: GENERAL RADIOLOGY | Age: 87
End: 2022-10-14
Payer: MEDICARE

## 2022-10-14 ENCOUNTER — HOSPITAL ENCOUNTER (OUTPATIENT)
Age: 87
Setting detail: OBSERVATION
Discharge: HOME | End: 2022-10-16
Attending: EMERGENCY MEDICINE | Admitting: INTERNAL MEDICINE
Payer: MEDICARE

## 2022-10-14 DIAGNOSIS — R77.8 TROPONIN I ABOVE REFERENCE RANGE: ICD-10-CM

## 2022-10-14 DIAGNOSIS — R06.02 SOB (SHORTNESS OF BREATH): Primary | ICD-10-CM

## 2022-10-14 DIAGNOSIS — I95.1 ORTHOSTASIS: ICD-10-CM

## 2022-10-14 PROBLEM — J06.9 URI WITH COUGH AND CONGESTION: Status: ACTIVE | Noted: 2022-10-14

## 2022-10-14 LAB
A/G RATIO: 1.2 (ref 1.1–2.2)
ALBUMIN SERPL-MCNC: 3.8 G/DL (ref 3.4–5)
ALP BLD-CCNC: 85 U/L (ref 40–129)
ALT SERPL-CCNC: 14 U/L (ref 10–40)
ANION GAP SERPL CALCULATED.3IONS-SCNC: 12 MMOL/L (ref 3–16)
AST SERPL-CCNC: 23 U/L (ref 15–37)
BASE EXCESS VENOUS: -4.1 MMOL/L (ref -3–3)
BASOPHILS ABSOLUTE: 0.1 K/UL (ref 0–0.2)
BASOPHILS RELATIVE PERCENT: 0.9 %
BILIRUB SERPL-MCNC: <0.2 MG/DL (ref 0–1)
BUN BLDV-MCNC: 36 MG/DL (ref 7–20)
CALCIUM SERPL-MCNC: 9.4 MG/DL (ref 8.3–10.6)
CARBOXYHEMOGLOBIN: 0.8 % (ref 0–1.5)
CHLORIDE BLD-SCNC: 100 MMOL/L (ref 99–110)
CO2: 22 MMOL/L (ref 21–32)
CREAT SERPL-MCNC: 1.6 MG/DL (ref 0.6–1.2)
EOSINOPHILS ABSOLUTE: 0 K/UL (ref 0–0.6)
EOSINOPHILS RELATIVE PERCENT: 0.1 %
GFR AFRICAN AMERICAN: 36
GFR NON-AFRICAN AMERICAN: 30
GLUCOSE BLD-MCNC: 109 MG/DL (ref 70–99)
HCO3 VENOUS: 20 MMOL/L (ref 23–29)
HCT VFR BLD CALC: 38.2 % (ref 36–48)
HEMOGLOBIN: 12.7 G/DL (ref 12–16)
INFLUENZA A: NOT DETECTED
INFLUENZA B: NOT DETECTED
LACTIC ACID: 1.1 MMOL/L (ref 0.4–2)
LYMPHOCYTES ABSOLUTE: 1.9 K/UL (ref 1–5.1)
LYMPHOCYTES RELATIVE PERCENT: 30.8 %
MAGNESIUM: 1.9 MG/DL (ref 1.8–2.4)
MCH RBC QN AUTO: 30.2 PG (ref 26–34)
MCHC RBC AUTO-ENTMCNC: 33.1 G/DL (ref 31–36)
MCV RBC AUTO: 91.3 FL (ref 80–100)
METHEMOGLOBIN VENOUS: 0.6 %
MONOCYTES ABSOLUTE: 0.7 K/UL (ref 0–1.3)
MONOCYTES RELATIVE PERCENT: 12.2 %
NEUTROPHILS ABSOLUTE: 3.4 K/UL (ref 1.7–7.7)
NEUTROPHILS RELATIVE PERCENT: 56 %
O2 SAT, VEN: 76 %
O2 THERAPY: ABNORMAL
PCO2, VEN: 33.8 MMHG (ref 40–50)
PDW BLD-RTO: 14.4 % (ref 12.4–15.4)
PH VENOUS: 7.39 (ref 7.35–7.45)
PLATELET # BLD: 131 K/UL (ref 135–450)
PMV BLD AUTO: 6.9 FL (ref 5–10.5)
PO2, VEN: 42.6 MMHG (ref 25–40)
POTASSIUM SERPL-SCNC: 4.3 MMOL/L (ref 3.5–5.1)
PRO-BNP: 4602 PG/ML (ref 0–449)
PROCALCITONIN: 0.06 NG/ML (ref 0–0.15)
RBC # BLD: 4.19 M/UL (ref 4–5.2)
SARS-COV-2 RNA, RT PCR: NOT DETECTED
SODIUM BLD-SCNC: 134 MMOL/L (ref 136–145)
TCO2 CALC VENOUS: 21 MMOL/L
TOTAL PROTEIN: 7.1 G/DL (ref 6.4–8.2)
TROPONIN: 0.03 NG/ML
TROPONIN: 0.03 NG/ML
WBC # BLD: 6 K/UL (ref 4–11)

## 2022-10-14 PROCEDURE — 85025 COMPLETE CBC W/AUTO DIFF WBC: CPT

## 2022-10-14 PROCEDURE — 99285 EMERGENCY DEPT VISIT HI MDM: CPT

## 2022-10-14 PROCEDURE — 93005 ELECTROCARDIOGRAM TRACING: CPT | Performed by: EMERGENCY MEDICINE

## 2022-10-14 PROCEDURE — 71045 X-RAY EXAM CHEST 1 VIEW: CPT

## 2022-10-14 PROCEDURE — 82803 BLOOD GASES ANY COMBINATION: CPT

## 2022-10-14 PROCEDURE — 84484 ASSAY OF TROPONIN QUANT: CPT

## 2022-10-14 PROCEDURE — 83605 ASSAY OF LACTIC ACID: CPT

## 2022-10-14 PROCEDURE — 6370000000 HC RX 637 (ALT 250 FOR IP): Performed by: NURSE PRACTITIONER

## 2022-10-14 PROCEDURE — 83735 ASSAY OF MAGNESIUM: CPT

## 2022-10-14 PROCEDURE — 6370000000 HC RX 637 (ALT 250 FOR IP): Performed by: EMERGENCY MEDICINE

## 2022-10-14 PROCEDURE — G0378 HOSPITAL OBSERVATION PER HR: HCPCS

## 2022-10-14 PROCEDURE — 87636 SARSCOV2 & INF A&B AMP PRB: CPT

## 2022-10-14 PROCEDURE — 80053 COMPREHEN METABOLIC PANEL: CPT

## 2022-10-14 PROCEDURE — 84145 PROCALCITONIN (PCT): CPT

## 2022-10-14 PROCEDURE — 83880 ASSAY OF NATRIURETIC PEPTIDE: CPT

## 2022-10-14 RX ORDER — GUAIFENESIN 600 MG/1
600 TABLET, EXTENDED RELEASE ORAL 2 TIMES DAILY
Status: DISCONTINUED | OUTPATIENT
Start: 2022-10-14 | End: 2022-10-16 | Stop reason: HOSPADM

## 2022-10-14 RX ORDER — ASPIRIN 325 MG
325 TABLET ORAL ONCE
Status: COMPLETED | OUTPATIENT
Start: 2022-10-14 | End: 2022-10-14

## 2022-10-14 RX ADMIN — ASPIRIN 325 MG: 325 TABLET ORAL at 20:09

## 2022-10-14 RX ADMIN — GUAIFENESIN 600 MG: 600 TABLET, EXTENDED RELEASE ORAL at 22:43

## 2022-10-14 ASSESSMENT — PAIN - FUNCTIONAL ASSESSMENT
PAIN_FUNCTIONAL_ASSESSMENT: NONE - DENIES PAIN
PAIN_FUNCTIONAL_ASSESSMENT: NONE - DENIES PAIN

## 2022-10-15 LAB
ANION GAP SERPL CALCULATED.3IONS-SCNC: 12 MMOL/L (ref 3–16)
BASOPHILS ABSOLUTE: 0 K/UL (ref 0–0.2)
BASOPHILS RELATIVE PERCENT: 0.5 %
BUN BLDV-MCNC: 27 MG/DL (ref 7–20)
CALCIUM SERPL-MCNC: 8.9 MG/DL (ref 8.3–10.6)
CHLORIDE BLD-SCNC: 105 MMOL/L (ref 99–110)
CO2: 23 MMOL/L (ref 21–32)
CREAT SERPL-MCNC: 1.3 MG/DL (ref 0.6–1.2)
EKG ATRIAL RATE: 115 BPM
EKG DIAGNOSIS: NORMAL
EKG Q-T INTERVAL: 348 MS
EKG QRS DURATION: 96 MS
EKG QTC CALCULATION (BAZETT): 451 MS
EKG R AXIS: -47 DEGREES
EKG T AXIS: -20 DEGREES
EKG VENTRICULAR RATE: 101 BPM
EOSINOPHILS ABSOLUTE: 0 K/UL (ref 0–0.6)
EOSINOPHILS RELATIVE PERCENT: 0.2 %
GFR AFRICAN AMERICAN: 46
GFR NON-AFRICAN AMERICAN: 38
GLUCOSE BLD-MCNC: 98 MG/DL (ref 70–99)
HCT VFR BLD CALC: 36.2 % (ref 36–48)
HEMOGLOBIN: 12.1 G/DL (ref 12–16)
LYMPHOCYTES ABSOLUTE: 1.6 K/UL (ref 1–5.1)
LYMPHOCYTES RELATIVE PERCENT: 41 %
MCH RBC QN AUTO: 30.5 PG (ref 26–34)
MCHC RBC AUTO-ENTMCNC: 33.4 G/DL (ref 31–36)
MCV RBC AUTO: 91.4 FL (ref 80–100)
MONOCYTES ABSOLUTE: 0.5 K/UL (ref 0–1.3)
MONOCYTES RELATIVE PERCENT: 12.2 %
NEUTROPHILS ABSOLUTE: 1.8 K/UL (ref 1.7–7.7)
NEUTROPHILS RELATIVE PERCENT: 46.1 %
PDW BLD-RTO: 14.1 % (ref 12.4–15.4)
PLATELET # BLD: 129 K/UL (ref 135–450)
PMV BLD AUTO: 6.7 FL (ref 5–10.5)
POTASSIUM REFLEX MAGNESIUM: 3.9 MMOL/L (ref 3.5–5.1)
RBC # BLD: 3.96 M/UL (ref 4–5.2)
SODIUM BLD-SCNC: 140 MMOL/L (ref 136–145)
TROPONIN: 0.02 NG/ML
TROPONIN: 0.03 NG/ML
WBC # BLD: 3.9 K/UL (ref 4–11)

## 2022-10-15 PROCEDURE — 85025 COMPLETE CBC W/AUTO DIFF WBC: CPT

## 2022-10-15 PROCEDURE — 84484 ASSAY OF TROPONIN QUANT: CPT

## 2022-10-15 PROCEDURE — 6370000000 HC RX 637 (ALT 250 FOR IP): Performed by: NURSE PRACTITIONER

## 2022-10-15 PROCEDURE — 80048 BASIC METABOLIC PNL TOTAL CA: CPT

## 2022-10-15 PROCEDURE — 36415 COLL VENOUS BLD VENIPUNCTURE: CPT

## 2022-10-15 PROCEDURE — 93010 ELECTROCARDIOGRAM REPORT: CPT | Performed by: INTERNAL MEDICINE

## 2022-10-15 PROCEDURE — 2580000003 HC RX 258: Performed by: NURSE PRACTITIONER

## 2022-10-15 PROCEDURE — G0378 HOSPITAL OBSERVATION PER HR: HCPCS

## 2022-10-15 PROCEDURE — 94669 MECHANICAL CHEST WALL OSCILL: CPT

## 2022-10-15 RX ORDER — LISINOPRIL 10 MG/1
10 TABLET ORAL DAILY
Status: DISCONTINUED | OUTPATIENT
Start: 2022-10-16 | End: 2022-10-16 | Stop reason: HOSPADM

## 2022-10-15 RX ORDER — SODIUM CHLORIDE 0.9 % (FLUSH) 0.9 %
5-40 SYRINGE (ML) INJECTION PRN
Status: DISCONTINUED | OUTPATIENT
Start: 2022-10-15 | End: 2022-10-16 | Stop reason: HOSPADM

## 2022-10-15 RX ORDER — BENZONATATE 100 MG/1
100 CAPSULE ORAL 3 TIMES DAILY PRN
Status: DISCONTINUED | OUTPATIENT
Start: 2022-10-15 | End: 2022-10-16 | Stop reason: HOSPADM

## 2022-10-15 RX ORDER — AMLODIPINE BESYLATE 5 MG/1
5 TABLET ORAL DAILY
Status: DISCONTINUED | OUTPATIENT
Start: 2022-10-15 | End: 2022-10-15

## 2022-10-15 RX ORDER — ACETAMINOPHEN 325 MG/1
650 TABLET ORAL EVERY 6 HOURS PRN
Status: DISCONTINUED | OUTPATIENT
Start: 2022-10-15 | End: 2022-10-16 | Stop reason: HOSPADM

## 2022-10-15 RX ORDER — LISINOPRIL 20 MG/1
20 TABLET ORAL DAILY
Status: DISCONTINUED | OUTPATIENT
Start: 2022-10-15 | End: 2022-10-15

## 2022-10-15 RX ORDER — ONDANSETRON 4 MG/1
4 TABLET, ORALLY DISINTEGRATING ORAL EVERY 8 HOURS PRN
Status: DISCONTINUED | OUTPATIENT
Start: 2022-10-15 | End: 2022-10-16 | Stop reason: HOSPADM

## 2022-10-15 RX ORDER — ONDANSETRON 2 MG/ML
4 INJECTION INTRAMUSCULAR; INTRAVENOUS EVERY 6 HOURS PRN
Status: DISCONTINUED | OUTPATIENT
Start: 2022-10-15 | End: 2022-10-16 | Stop reason: HOSPADM

## 2022-10-15 RX ORDER — ALBUTEROL SULFATE 2.5 MG/3ML
2.5 SOLUTION RESPIRATORY (INHALATION) EVERY 6 HOURS PRN
Status: DISCONTINUED | OUTPATIENT
Start: 2022-10-15 | End: 2022-10-16 | Stop reason: HOSPADM

## 2022-10-15 RX ORDER — POLYETHYLENE GLYCOL 3350 17 G/17G
17 POWDER, FOR SOLUTION ORAL DAILY PRN
Status: DISCONTINUED | OUTPATIENT
Start: 2022-10-15 | End: 2022-10-16 | Stop reason: HOSPADM

## 2022-10-15 RX ORDER — ENOXAPARIN SODIUM 100 MG/ML
40 INJECTION SUBCUTANEOUS DAILY
Status: DISCONTINUED | OUTPATIENT
Start: 2022-10-15 | End: 2022-10-15

## 2022-10-15 RX ORDER — ROSUVASTATIN CALCIUM 10 MG/1
10 TABLET, COATED ORAL NIGHTLY
Status: DISCONTINUED | OUTPATIENT
Start: 2022-10-15 | End: 2022-10-16 | Stop reason: HOSPADM

## 2022-10-15 RX ORDER — METOPROLOL SUCCINATE 25 MG/1
25 TABLET, EXTENDED RELEASE ORAL 2 TIMES DAILY
Status: DISCONTINUED | OUTPATIENT
Start: 2022-10-15 | End: 2022-10-16 | Stop reason: HOSPADM

## 2022-10-15 RX ORDER — OXYBUTYNIN CHLORIDE 5 MG/1
5 TABLET, EXTENDED RELEASE ORAL DAILY
Status: DISCONTINUED | OUTPATIENT
Start: 2022-10-15 | End: 2022-10-16 | Stop reason: HOSPADM

## 2022-10-15 RX ORDER — SODIUM CHLORIDE 9 MG/ML
INJECTION, SOLUTION INTRAVENOUS PRN
Status: DISCONTINUED | OUTPATIENT
Start: 2022-10-15 | End: 2022-10-16 | Stop reason: HOSPADM

## 2022-10-15 RX ORDER — ACETAMINOPHEN 650 MG/1
650 SUPPOSITORY RECTAL EVERY 6 HOURS PRN
Status: DISCONTINUED | OUTPATIENT
Start: 2022-10-15 | End: 2022-10-16 | Stop reason: HOSPADM

## 2022-10-15 RX ORDER — SODIUM CHLORIDE 0.9 % (FLUSH) 0.9 %
5-40 SYRINGE (ML) INJECTION EVERY 12 HOURS SCHEDULED
Status: DISCONTINUED | OUTPATIENT
Start: 2022-10-15 | End: 2022-10-16 | Stop reason: HOSPADM

## 2022-10-15 RX ADMIN — LISINOPRIL 20 MG: 20 TABLET ORAL at 09:30

## 2022-10-15 RX ADMIN — OXYBUTYNIN CHLORIDE 5 MG: 5 TABLET, EXTENDED RELEASE ORAL at 09:30

## 2022-10-15 RX ADMIN — GUAIFENESIN 600 MG: 600 TABLET, EXTENDED RELEASE ORAL at 09:30

## 2022-10-15 RX ADMIN — APIXABAN 2.5 MG: 2.5 TABLET, FILM COATED ORAL at 01:43

## 2022-10-15 RX ADMIN — Medication 10 ML: at 19:41

## 2022-10-15 RX ADMIN — APIXABAN 2.5 MG: 2.5 TABLET, FILM COATED ORAL at 09:30

## 2022-10-15 RX ADMIN — METOPROLOL SUCCINATE 25 MG: 25 TABLET, EXTENDED RELEASE ORAL at 01:43

## 2022-10-15 RX ADMIN — ROSUVASTATIN CALCIUM 10 MG: 10 TABLET, FILM COATED ORAL at 19:41

## 2022-10-15 RX ADMIN — METOPROLOL SUCCINATE 25 MG: 25 TABLET, EXTENDED RELEASE ORAL at 09:52

## 2022-10-15 RX ADMIN — APIXABAN 2.5 MG: 2.5 TABLET, FILM COATED ORAL at 19:41

## 2022-10-15 RX ADMIN — Medication 10 ML: at 09:30

## 2022-10-15 RX ADMIN — GUAIFENESIN 600 MG: 600 TABLET, EXTENDED RELEASE ORAL at 19:41

## 2022-10-15 RX ADMIN — ROSUVASTATIN CALCIUM 10 MG: 10 TABLET, FILM COATED ORAL at 01:43

## 2022-10-15 RX ADMIN — METOPROLOL SUCCINATE 25 MG: 25 TABLET, EXTENDED RELEASE ORAL at 19:41

## 2022-10-15 NOTE — PLAN OF CARE
Problem: Safety - Adult  Goal: Free from fall injury  10/15/2022 1017 by Aldo Martin RN  Outcome: Progressing  Note: Bed locked in lowest position, non-skid socks in use, bed alarm active and audible. Encouraged patient to call out for assistance, patient verbalized understanding.     10/15/2022 0219 by Marleny Luciano RN  Outcome: Progressing

## 2022-10-15 NOTE — H&P
Hospital Medicine History & Physical      PCP: Claudetta Shall, MD    Date of Admission: 10/14/2022    Date of Service: Pt seen/examined on 10/14/2022 and Admitted to observation with expected LOS less than two midnights due to medical therapy. Chief Complaint:  cough and congestion    History Of Present Illness:      80 y.o. female, with PMH of  HTN, HLD and atrial fibrillation who presented to [facility] with cough and congestion. History obtained from the patient and review of EMR. The patient stated she has been experiencing a cough and congestion for the last 2 weeks. She stated she is unsure if she has had a fever, but she does not think she has. The patient stated her son lives with her and he has also been sick. She stated they both have taken COVID test at home and have both been negative. Otherwise, they have not been around any sick contacts. The patient stated she has been taking over-the-counter cough medication and she feels extremely congested in her chest.  She stated she is able to cough up some green sputum at times. The patient stated she decided to go to the emergency room this evening because she is concerned she may have pneumonia and needed an antibiotic. In the emergency room a chest x-ray was obtained that revealed no acute cardiopulmonary findings. The patient was admitted for further evaluation and treatment. Mucinex and Tessalon Perles were initiated. Antibiotics were not started due to the patient not revealing a white count and/or fever. This is likely viral rather than bacterial. The patient denied any other associated symptoms as well as any aggravating and/or alleviating factors. At the time of this assessment, the patient was resting comfortably in bed. She currently denies any chest pain, back pain, abdominal pain, shortness of breath, numbness, tingling, N/V/C/D, fever and/or chills.      Past Medical History:          Diagnosis Date    [facility] ([facility] Achilles tendon injury 8/23/2016    Arthritis     Hyperlipidemia     Hypertension     Kidney insufficiency     MI, old     PONV (postoperative nausea and vomiting)     Urinary frequency     UTI (urinary tract infection) 8/19/2018     Past Surgical History:          Procedure Laterality Date    ACHILLES TENDON SURGERY  03/02/2017    R achilles tendon debridement     BACK SURGERY  2003    back surgery x3; lower back    CARPAL TUNNEL RELEASE Right 2002    CORONARY ANGIOPLASTY WITH STENT PLACEMENT  2006    HIP SURGERY Left 1/21/2016    hip pinning    HYSTERECTOMY  1965     Medications Prior to Admission:      Prior to Admission medications    Medication Sig Start Date End Date Taking? Authorizing Provider   amLODIPine (NORVASC) 5 MG tablet Take 5 mg by mouth daily 6/8/21   Historical Provider, MD   oxybutynin (DITROPAN XL) 5 MG extended release tablet Take 1 tablet by mouth daily 3/11/17   MICHELLE Gray CNP   metoprolol succinate (TOPROL XL) 25 MG extended release tablet Take 25 mg by mouth 2 times daily    Historical Provider, MD   Multiple Vitamins-Minerals (THERAPEUTIC MULTIVITAMIN-MINERALS) tablet Take 1 tablet by mouth daily    Historical Provider, MD   ELIQUIS 2.5 MG TABS tablet TK 1 T PO BID 8/13/16   Historical Provider, MD   CRESTOR 10 MG tablet TK 1 T PO QOD (pt states she takes 3-4 tabs per week) 8/13/16   Historical Provider, MD   acetaminophen 650 MG TABS Take 650 mg by mouth every 4 hours as needed 1/24/16   CHRISTIANE Menezes   lisinopril (PRINIVIL;ZESTRIL) 20 MG tablet Take 20 mg by mouth daily    Historical Provider, MD     Allergies:  Epinephrine and Other    Social History:      The patient currently lives at home    TOBACCO:   reports that she has never smoked. She has never used smokeless tobacco.  ETOH:   reports no history of alcohol use.   E-cigarette/Vaping       Questions Responses    E-cigarette/Vaping Use Never User    Start Date     Passive Exposure     Quit Date     Counseling Given     Comments           Family History:    dsx  Reviewed and negative in regards to presenting illness/complaint. No family history on file. REVIEW OF SYSTEMS COMPLETED:   Pertinent positives as noted in the HPI. All other systems reviewed and negative. PHYSICAL EXAM PERFORMED:    BP (!) 162/75   Pulse 88   Temp 97.7 °F (36.5 °C)   Resp 17   SpO2 98%     General appearance:  Pleasant, elderly female in no apparent distress, appears stated age and cooperative. HEENT: Pupils equal, round, and reactive to light. Extra ocular muscles intact. Conjunctivae/corneas clear. Neck: Supple, with full range of motion. No jugular venous distention. Trachea midline. Respiratory:  Normal respiratory effort. Clear to auscultation, bilaterally without Rales/Wheezes/Rhonchi. Cardiovascular:  Regular rate and rhythm with normal S1/S2 without murmurs, rubs or gallops. Abdomen: Soft, non-tender, non-distended with normal bowel sounds. Musculoskeletal:  No clubbing, cyanosis or edema bilaterally. Full range of motion without deformity. Skin: Skin color, texture, turgor normal.  No significant rashes or lesions.   Neurologic:  Neurovascularly intact Cranial nerves: II-XII intact, grossly non-focal.  Psychiatric:  Alert and oriented, thought content appropriate, normal insight  Capillary Refill: Brisk,3 seconds, normal  Peripheral Pulses: +2 palpable, equal bilaterally     Labs:     Recent Labs     10/14/22  1854 10/14/22  2110   TROPONINI 0.03* 0.03*     Urinalysis:      Lab Results   Component Value Date/Time    NITRU Negative 11/04/2021 01:42 PM    WBCUA 5 11/04/2021 01:42 PM    BACTERIA 4+ 08/19/2018 04:34 PM    RBCUA 4 11/04/2021 01:42 PM    BLOODU Negative 11/04/2021 01:42 PM    SPECGRAV 1.018 11/04/2021 01:42 PM    GLUCOSEU Negative 11/04/2021 01:42 PM     Radiology:     CXR: I have reviewed the CXR with the following interpretation: No acute cardiopulmonary findings    EKG:  I have reviewed the EKG with the Sandeep----------------------------

## 2022-10-15 NOTE — PROGRESS NOTES
Hospitalist Progress Note      PCP: Justyn Schwartz MD    Date of Admission: 10/14/2022    Chief Complaint:  cough and congestion    Hospital Course: reviewed     Subjective: resting in bed, family at bedside, no complaints currently       Medications:  Reviewed    Infusion Medications    sodium chloride       Scheduled Medications    rosuvastatin  10 mg Oral Nightly    apixaban  2.5 mg Oral BID    metoprolol succinate  25 mg Oral BID    oxybutynin  5 mg Oral Daily    sodium chloride flush  5-40 mL IntraVENous 2 times per day    [START ON 10/16/2022] lisinopril  10 mg Oral Daily    guaiFENesin  600 mg Oral BID     PRN Meds: sodium chloride flush, sodium chloride, ondansetron **OR** ondansetron, polyethylene glycol, acetaminophen **OR** acetaminophen, albuterol, benzonatate      Intake/Output Summary (Last 24 hours) at 10/15/2022 1425  Last data filed at 10/15/2022 1243  Gross per 24 hour   Intake 480 ml   Output --   Net 480 ml       Physical Exam Performed:    /72   Pulse 82   Temp 97.5 °F (36.4 °C) (Oral)   Resp 18   Ht 5' 5\" (1.651 m)   Wt 132 lb 8 oz (60.1 kg)   SpO2 97%   BMI 22.05 kg/m²     General appearance:  Pleasant, elderly female in no apparent distress, appears stated age and cooperative. HEENT: Pupils equal, round, and reactive to light. Extra ocular muscles intact. Conjunctivae/corneas clear. Neck: Supple, with full range of motion. No jugular venous distention. Trachea midline. Respiratory:  Normal respiratory effort. Clear to auscultation, bilaterally without Rales/Wheezes/Rhonchi. Cardiovascular:  Regular rate and rhythm with normal S1/S2 without murmurs, rubs or gallops. Abdomen: Soft, non-tender, non-distended with normal bowel sounds. Musculoskeletal:  No clubbing, cyanosis or edema bilaterally. Full range of motion without deformity. Skin: Skin color, texture, turgor normal.  No significant rashes or lesions.   Neurologic:  Neurovascularly intact Cranial nerves: Regular  Code Status: DNR-CCA  PT/OT Eval Status: ordered 10/15    Dispo - pending workup, cards thoughts    Appropriate for A1 Discharge Unit: No      Marlee Burroughs MD

## 2022-10-15 NOTE — RT PROTOCOL NOTE
RT Inhaler-Nebulizer Bronchodilator Protocol Note    There is a bronchodilator order in the chart from a provider indicating to follow the RT Bronchodilator Protocol and there is an Initiate RT Inhaler-Nebulizer Bronchodilator Protocol order as well (see protocol at bottom of note). CXR Findings:  XR CHEST PORTABLE    Result Date: 10/14/2022  No acute cardiopulmonary disease. The findings from the last RT Protocol Assessment were as follows:   History Pulmonary Disease: None or smoker <15 pack years  Respiratory Pattern: Dyspnea on exertion or RR 21-25 bpm  Breath Sounds: Clear breath sounds  Cough: Strong, spontaneous, non-productive  Indication for Bronchodilator Therapy: None  Bronchodilator Assessment Score: 2    Aerosolized bronchodilator medication orders have been revised according to the RT Inhaler-Nebulizer Bronchodilator Protocol below. Respiratory Therapist to perform RT Therapy Protocol Assessment initially then follow the protocol. Repeat RT Therapy Protocol Assessment PRN for score 0-3 or on second treatment, BID, and PRN for scores above 3. No Indications - adjust the frequency to every 6 hours PRN wheezing or bronchospasm, if no treatments needed after 48 hours then discontinue using Per Protocol order mode. If indication present, adjust the RT bronchodilator orders based on the Bronchodilator Assessment Score as indicated below. Use Inhaler orders unless patient has one or more of the following: on home nebulizer, not able to hold breath for 10 seconds, is not alert and oriented, cannot activate and use MDI correctly, or respiratory rate 25 breaths per minute or more, then use the equivalent nebulizer order(s) with same Frequency and PRN reasons based on the score. If a patient is on this medication at home then do not decrease Frequency below that used at home.     0-3 - enter or revise RT bronchodilator order(s) to equivalent RT Bronchodilator order with Frequency of every 4 hours PRN for wheezing or increased work of breathing using Per Protocol order mode. 4-6 - enter or revise RT Bronchodilator order(s) to two equivalent RT bronchodilator orders with one order with BID Frequency and one order with Frequency of every 4 hours PRN wheezing or increased work of breathing using Per Protocol order mode. 7-10 - enter or revise RT Bronchodilator order(s) to two equivalent RT bronchodilator orders with one order with TID Frequency and one order with Frequency of every 4 hours PRN wheezing or increased work of breathing using Per Protocol order mode. 11-13 - enter or revise RT Bronchodilator order(s) to one equivalent RT bronchodilator order with QID Frequency and an Albuterol order with Frequency of every 4 hours PRN wheezing or increased work of breathing using Per Protocol order mode. Greater than 13 - enter or revise RT Bronchodilator order(s) to one equivalent RT bronchodilator order with every 4 hours Frequency and an Albuterol order with Frequency of every 2 hours PRN wheezing or increased work of breathing using Per Protocol order mode. RT to enter RT Home Evaluation for COPD & MDI Assessment order using Per Protocol order mode.     Electronically signed by Mariluz Almazan RCP on 10/15/2022 at 3:11 AM

## 2022-10-15 NOTE — PROGRESS NOTES
Pt admitted to C3 room 330 at 0100. Pt ambulated to bathroom with standby assistance. Pt oriented to room, call bell within reach, bed alarm on. Pt agreeable to plan of care. Pt could not remember her home medications. She said her son could bring a list  in tomorrow. Type 2 diabetes mellitus with hyperglycemia, with long-term current use of insulin

## 2022-10-15 NOTE — PROGRESS NOTES
Shift assessment updated. Patient a/ox4. VSS. Rm air. Skin assessment completed with Mckay perez RN, no breakdown noted. Medication list verified with patient's pharmacy, notified MD. Lungs clear, instructed patient on IS use. Denies needs. Bed alarm in use for patient safety. Will monitor.

## 2022-10-15 NOTE — ED NOTES
Pt pants removed placed in pajama bottoms/shoes/other clothing/personal belongings placed in hospital bag.       Tita Wong LPN  46/44/35 [id number]

## 2022-10-15 NOTE — ED PROVIDER NOTES
CHIEF COMPLAINT  Cough (For past 2 weeks pt has had \"a real bad cold, coughing\"  unsure of fever.  ) and Dizziness (Dizzy for past 2 weeks son reports low bp readings at home.  )      HISTORY OF PRESENT ILLNESS  Lacey Callahan is a 80 y.o. female with a history of CAD with stent, atrial fibrillation, hypertension, dyslipidemia who presents to the ED complaining of possible pneumonia. Patient states for the past 2 weeks she has experienced a cough with accompanied shortness of breath. Sometimes her cough is productive of greenish sputum. She has been taking over-the-counter cough and cold medication without improvement. She also reports intermittent episodes of dizziness described as lightheadedness particularly when standing up from a seated position. When she did this today her blood pressure was found to be low. She denies chest pain, diaphoresis, nausea, vomiting, syncope, hemoptysis, abdominal pain. No known sick contacts. No other complaints, modifying factors or associated symptoms. I have reviewed the following from the nursing documentation. Past Medical History:   Diagnosis Date    A-fib [facility])     Achilles tendon injury 8/23/2016    Arthritis     Hyperlipidemia     Hypertension     Kidney insufficiency     MI, old     PONV (postoperative nausea and vomiting)     Urinary frequency     UTI (urinary tract infection) 8/19/2018     Past Surgical History:   Procedure Laterality Date    ACHILLES TENDON SURGERY  03/02/2017    R achilles tendon debridement     BACK SURGERY  2003    back surgery x3; lower back    CARPAL TUNNEL RELEASE Right 2002    CORONARY ANGIOPLASTY WITH STENT PLACEMENT  2006    HIP SURGERY Left 1/21/2016    hip pinning    HYSTERECTOMY  1965     No family history on file. Social History     Socioeconomic History    Marital status:       Spouse name: Not on file    Number of children: Not on file    Years of education: Not on file    Highest education level: Not on file ENT: Mucous membranes are moist.   NECK: Supple, trachea midline. HEART: Irregularly irregular, rate 100. Normal S1, S2. No murmurs, rubs or gallops. LUNGS: Respirations unlabored. CTAB. Moderate air exchange. No wheezes or rhonchi. Faint bibasilar rales. Speaking comfortably in full sentences. ABDOMEN: Soft. Non-distended. Non-tender. No guarding or rebound. Normal Bowel sounds. EXTREMITIES: No peripheral edema. MAEE. No acute deformities. SKIN: Warm and dry. No acute rashes. NEUROLOGICAL: Alert and oriented X 3. CN II-XII intact. No gross facial drooping. Strength 5/5 in all extremities. PSYCHIATRIC: Normal mood and affect. LABS  I have reviewed all labs for this visit.    Results for orders placed or performed during the hospital encounter of 10/14/22   COVID-19 & Influenza Combo    Specimen: Nasopharyngeal Swab   Result Value Ref Range    SARS-CoV-2 RNA, RT PCR NOT DETECTED NOT DETECTED    INFLUENZA A NOT DETECTED NOT DETECTED    INFLUENZA B NOT DETECTED NOT DETECTED   Blood gas, venous   Result Value Ref Range    pH, Tevin 7.390 7.350 - 7.450    pCO2, Tevin 33.8 (L) 40.0 - 50.0 mmHg    pO2, Tevin 42.6 (H) 25.0 - 40.0 mmHg    HCO3, Venous 20.0 (L) 23.0 - 29.0 mmol/L    Base Excess, Tevin -4.1 (L) -3.0 - 3.0 mmol/L    O2 Sat, Tevin 76 Not Established %    Carboxyhemoglobin 0.8 0.0 - 1.5 %    MetHgb, Tevin 0.6 <1.5 %    TC02 (Calc), Tevin 21 Not Established mmol/L    O2 Therapy Unknown    CBC with Auto Differential   Result Value Ref Range    WBC 6.0 4.0 - 11.0 K/uL    RBC 4.19 4.00 - 5.20 M/uL    Hemoglobin 12.7 12.0 - 16.0 g/dL    Hematocrit 38.2 36.0 - 48.0 %    MCV 91.3 80.0 - 100.0 fL    MCH 30.2 26.0 - 34.0 pg    MCHC 33.1 31.0 - 36.0 g/dL    RDW 14.4 12.4 - 15.4 %    Platelets 824 (L) 107 - 450 K/uL    MPV 6.9 5.0 - 10.5 fL    Neutrophils % 56.0 %    Lymphocytes % 30.8 %    Monocytes % 12.2 %    Eosinophils % 0.1 %    Basophils % 0.9 %    Neutrophils Absolute 3.4 1.7 - 7.7 K/uL    Lymphocytes Absolute 1.9 1.0 - 5.1 K/uL    Monocytes Absolute 0.7 0.0 - 1.3 K/uL    Eosinophils Absolute 0.0 0.0 - 0.6 K/uL    Basophils Absolute 0.1 0.0 - 0.2 K/uL   Comprehensive Metabolic Panel   Result Value Ref Range    Sodium 134 (L) 136 - 145 mmol/L    Potassium 4.3 3.5 - 5.1 mmol/L    Chloride 100 99 - 110 mmol/L    CO2 22 21 - 32 mmol/L    Anion Gap 12 3 - 16    Glucose 109 (H) 70 - 99 mg/dL    BUN 36 (H) 7 - 20 mg/dL    Creatinine 1.6 (H) 0.6 - 1.2 mg/dL    GFR Non-African American 30 (A) >60    GFR  36 (A) >60    Calcium 9.4 8.3 - 10.6 mg/dL    Total Protein 7.1 6.4 - 8.2 g/dL    Albumin 3.8 3.4 - 5.0 g/dL    Albumin/Globulin Ratio 1.2 1.1 - 2.2    Total Bilirubin <0.2 0.0 - 1.0 mg/dL    Alkaline Phosphatase 85 40 - 129 U/L    ALT 14 10 - 40 U/L    AST 23 15 - 37 U/L   Magnesium   Result Value Ref Range    Magnesium 1.90 1.80 - 2.40 mg/dL   Lactic Acid   Result Value Ref Range    Lactic Acid 1.1 0.4 - 2.0 mmol/L   Troponin   Result Value Ref Range    Troponin 0.03 (H) <0.01 ng/mL   Procalcitonin   Result Value Ref Range    Procalcitonin 0.06 0.00 - 0.15 ng/mL   Brain Natriuretic Peptide   Result Value Ref Range    Pro-BNP 4,602 (H) 0 - 449 pg/mL   EKG 12 Lead   Result Value Ref Range    Ventricular Rate 101 BPM    Atrial Rate 115 BPM    QRS Duration 96 ms    Q-T Interval 348 ms    QTc Calculation (Bazett) 451 ms    R Axis -47 degrees    T Axis -20 degrees    Diagnosis       Atrial fibrillation with rapid ventricular response with premature ventricular or aberrantly conducted complexesLeft axis deviationIncomplete right bundle branch blockAnteroseptal infarct (cited on or before 19-JUL-2021)Abnormal ECGWhen compared with ECG of 19-JUL-2021 11:30,Atrial fibrillation has replaced Sinus rhythmVent.  rate has increased BY  46 BPMQuestionable change in initial forces of Anterior leadsNonspecific T wave abnormality now evident in Inferior leads         EKG  Atrial fibrillation with RVR, rate 101, leftward axis, QTC within normal limits, incomplete right bundle pattern, no acute ST or T wave changes from prior on July 19, 2021. At that time she was normal sinus rhythm. RADIOLOGY  X-RAYS:  I have reviewed radiologic plain film image(s). ALL OTHER NON-PLAIN FILM IMAGES SUCH AS CT, ULTRASOUND AND MRI HAVE BEEN READ BY THE RADIOLOGIST. XR CHEST PORTABLE   Final Result   No acute cardiopulmonary disease. Rechecks: Physical assessment performed. Appears comfortable, nontoxic    ED COURSE/MDM  Patient seen and evaluated. Old records reviewed. Labs and imaging reviewed and results discussed with patient. 66-year-old female with history of CAD with stent, A. fib, hypertension, dyslipidemia presents with dyspnea, cough, orthostatic hypotension. Symptoms ongoing for 2 weeks. She was initially concerned about the possibility of pneumonia however she is afebrile and without leukocytosis or infiltrate on chest x-ray. Work-up thus far demonstrates an elevated BNP as well as an abnormal troponin of 0.03. She denies chest pain. No acute EKG changes. At this time it seems likely her symptoms may be cardiac in nature rather than representative of viral URI or strictly pulmonary. Plan for hospitalist admission for further work-up. New Prescriptions    No medications on file       CLINICAL IMPRESSION  1. SOB (shortness of breath)    2. Troponin I above reference range    3. Orthostasis        Blood pressure (!) 135/96, pulse 57, temperature 98.5 °F (36.9 °C), temperature source Oral, resp. rate 12, SpO2 98 %, not currently breastfeeding. DISPOSITION  Nicole Mobley was admitted in stable condition.        Sussy Woo MD  10/14/22 0365

## 2022-10-16 VITALS
HEIGHT: 65 IN | WEIGHT: 132.5 LBS | RESPIRATION RATE: 16 BRPM | HEART RATE: 99 BPM | SYSTOLIC BLOOD PRESSURE: 156 MMHG | OXYGEN SATURATION: 99 % | BODY MASS INDEX: 22.08 KG/M2 | TEMPERATURE: 97.2 F | DIASTOLIC BLOOD PRESSURE: 77 MMHG

## 2022-10-16 PROBLEM — I25.10 CORONARY ARTERY DISEASE INVOLVING NATIVE CORONARY ARTERY OF NATIVE HEART WITHOUT ANGINA PECTORIS: Status: ACTIVE | Noted: 2022-10-16

## 2022-10-16 PROBLEM — R77.8 ELEVATED TROPONIN: Status: ACTIVE | Noted: 2022-10-16

## 2022-10-16 PROBLEM — R79.89 ELEVATED TROPONIN: Status: ACTIVE | Noted: 2022-10-16

## 2022-10-16 PROCEDURE — 97161 PT EVAL LOW COMPLEX 20 MIN: CPT

## 2022-10-16 PROCEDURE — 97530 THERAPEUTIC ACTIVITIES: CPT

## 2022-10-16 PROCEDURE — 97165 OT EVAL LOW COMPLEX 30 MIN: CPT

## 2022-10-16 PROCEDURE — 6370000000 HC RX 637 (ALT 250 FOR IP): Performed by: INTERNAL MEDICINE

## 2022-10-16 PROCEDURE — 6370000000 HC RX 637 (ALT 250 FOR IP): Performed by: NURSE PRACTITIONER

## 2022-10-16 PROCEDURE — G0378 HOSPITAL OBSERVATION PER HR: HCPCS

## 2022-10-16 PROCEDURE — 2580000003 HC RX 258: Performed by: NURSE PRACTITIONER

## 2022-10-16 PROCEDURE — 99205 OFFICE O/P NEW HI 60 MIN: CPT | Performed by: INTERNAL MEDICINE

## 2022-10-16 RX ORDER — GUAIFENESIN 600 MG/1
600 TABLET, EXTENDED RELEASE ORAL 2 TIMES DAILY
Qty: 6 TABLET | Refills: 0 | Status: SHIPPED | OUTPATIENT
Start: 2022-10-16 | End: 2022-10-19

## 2022-10-16 RX ORDER — BENZONATATE 100 MG/1
100 CAPSULE ORAL 3 TIMES DAILY PRN
Qty: 15 CAPSULE | Refills: 0 | Status: SHIPPED | OUTPATIENT
Start: 2022-10-16

## 2022-10-16 RX ORDER — METOPROLOL SUCCINATE 25 MG/1
25 TABLET, EXTENDED RELEASE ORAL 2 TIMES DAILY
Qty: 60 TABLET | Refills: 1 | Status: SHIPPED | OUTPATIENT
Start: 2022-10-16

## 2022-10-16 RX ADMIN — LISINOPRIL 10 MG: 10 TABLET ORAL at 08:55

## 2022-10-16 RX ADMIN — Medication 10 ML: at 08:55

## 2022-10-16 RX ADMIN — GUAIFENESIN 600 MG: 600 TABLET, EXTENDED RELEASE ORAL at 08:55

## 2022-10-16 RX ADMIN — APIXABAN 2.5 MG: 2.5 TABLET, FILM COATED ORAL at 08:55

## 2022-10-16 RX ADMIN — METOPROLOL SUCCINATE 25 MG: 25 TABLET, EXTENDED RELEASE ORAL at 08:55

## 2022-10-16 RX ADMIN — OXYBUTYNIN CHLORIDE 5 MG: 5 TABLET, EXTENDED RELEASE ORAL at 08:55

## 2022-10-16 NOTE — PROGRESS NOTES
Tub/Shower unit  Bathroom Toilet: Handicap height  Bathroom Equipment: Tub transfer bench  Has the patient had two or more falls in the past year or any fall with injury in the past year?: No  Receives Help From: Family (drives pt to grocery)  ADL Assistance: Independent  Homemaking Responsibilities: Yes  Meal Prep Responsibility: Primary  Laundry Responsibility: Primary  Cleaning Responsibility: Primary  Bill Paying/Finance Responsibility: Primary  Shopping Responsibility: Primary  Ambulation Assistance: Independent (without AD)  Transfer Assistance: Independent  Active : No  Occupation: Retired  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  Leisure & Hobbies: feed the [employer], [employer], spend time with family       Objective   Heart Rate: 89  Heart Rate Source: Brachial;Right  BP: (!) 168/80  BP Location: Right upper arm  BP Method: Automatic  Patient Position: Sitting  MAP (Calculated): 109.33  Resp: 16  SpO2: 98 %  O2 Device: None (Room air)             Safety Devices  Type of Devices: Chair alarm in place; Left in chair;Call light within reach;Nurse notified  Restraints  Restraints Initially in Place: No     Toilet Transfers  Toilet - Technique: Ambulating (supervision without AD)  Equipment Used: Standard toilet  Toilet Transfer: Independent    AROM: Within functional limits  Strength: Within functional limits  Coordination: Within functional limits  Tone: Normal    ADL  Feeding: Independent  Grooming:  (standing at sink to wash hands)  LE Dressing: Independent  Toileting: Independent        Bed mobility  Supine to Sit: Independent  Sit to Supine: Unable to assess (Left up in chair for breakfast)  Transfers  Sit to stand: Independent  Stand to sit:  Independent    Vision - Basic Assessment  Prior Vision: Wears glasses only for reading  Vision  Vision: Impaired  Vision Exceptions: Wears glasses for reading    Hearing  Hearing: Within functional limits    Cognition  Overall Cognitive Status: Exceptions  Arousal/Alertness: Delayed responses to stimuli  Following Commands:  Follows one step commands consistently  Attention Span: Appears intact  Memory: Decreased short term memory  Safety Judgement: Good awareness of safety precautions  Insights: Fully aware of deficits  Initiation: Does not require cues  Sequencing: Does not require cues  Orientation  Overall Orientation Status: Impaired  Orientation Level: Disoriented to time;Oriented to person;Oriented to situation;Oriented to place                  Education Given To: Patient  Education Provided Comments: disease specific: importance of OOB to chair for meals; use of RED/nurse call light for assist with mobility/transfers  Education Method: Demonstration;Verbal  Barriers to Learning: None  Education Outcome: Verbalized understanding         AM-PAC Score        AM-PAC Inpatient Daily Activity Raw Score: 23 (10/16/22 0834)  AM-PAC Inpatient ADL T-Scale Score : 51.12 (10/16/22 0834)  ADL Inpatient CMS 0-100% Score: 15.86 (10/16/22 0834)  ADL Inpatient CMS G-Code Modifier : CI (10/16/22 0834)    Goals    Patient goals : \"go home today\"       Therapy Time   Individual Concurrent Group Co-treatment   Time In [address]         Time Out 0800         Minutes [address] [facility] [address] [place] [place]

## 2022-10-16 NOTE — CONSULTS
Consult placed  Consult re-called to cardiology  Who:Dr. Young  Date:10/16/2022,  Time:1:08 PM        Electronically signed by Jazmyne Hernandez on 10/16/2022 at 1:08 PM

## 2022-10-16 NOTE — DISCHARGE SUMMARY
Hospital Medicine Discharge Summary    Patient ID: Matteo Lindo      Patient's PCP: Ryder Mack MD    Admit Date: 10/14/2022     Discharge Date: 10/16/2022      Admitting Provider: Chetna Appiah MD     Discharge Provider: Elida Pierre MD     Discharge Diagnoses: Active Hospital Problems    Diagnosis     Elevated troponin [R77.8]      Priority: Medium    Coronary artery disease involving native coronary artery of native heart without angina pectoris [I25.10]      Priority: Medium    URI with cough and congestion [J06.9]      Priority: Medium    Hypertension [I10]     Hyperlipidemia [E78.5]     Atrial fibrillation (HCC) [I48.91]        The patient was seen and examined on day of discharge and this discharge summary is in conjunction with any daily progress note from day of discharge. Hospital Course:   History Of Present Illness:       80 y.o. female, with PMH of  HTN, HLD and atrial fibrillation who presented to [facility] with cough and congestion. History obtained from the patient and review of EMR. The patient stated she has been experiencing a cough and congestion for the last 2 weeks. She stated she is unsure if she has had a fever, but she does not think she has. The patient stated her son lives with her and he has also been sick. She stated they both have taken COVID test at home and have both been negative. Otherwise, they have not been around any sick contacts. The patient stated she has been taking over-the-counter cough medication and she feels extremely congested in her chest.  She stated she is able to cough up some green sputum at times. The patient stated she decided to go to the emergency room this evening because she is concerned she may have pneumonia and needed an antibiotic. In the emergency room a chest x-ray was obtained that revealed no acute cardiopulmonary findings. The patient was admitted for further evaluation and treatment.   Mucinex and Tessalon Perles were initiated. Antibiotics were not started due to the patient not revealing a white count and/or fever. This is likely viral rather than bacterial. The patient denied any other associated symptoms as well as any aggravating and/or alleviating factors. At the time of this assessment, the patient was resting comfortably in bed. She currently denies any chest pain, back pain, abdominal pain, shortness of breath, numbness, tingling, N/V/C/D, fever and/or chills. Cough and congestion likely 2/2 URI  -CXR revealed: no acute cardiopulmonary findings  -mucinex ordered  -encouraged coughing and deep breathing  -tessalon  -chest PT  -PRN nebulizers    Essential HTN  -continued amlodipine, metoprolol and lisinopril     HLD  -continued rosuvastatin     Atrial fibrillation  -anticoagulated on eliquis  -continue BB     Elevated troponin 0.03 on admission  -likely 2/2 CKD  -no c/o chest pain at this time  -trended troponin and flat/stable  -tele monitored  -cards consulted, no further workup needed as low clinical suspicion for ACS and felt related to renal insufficiency     CKD, cr 1.6 on admission  -appears to be baseline  -bmp monitored       Physical Exam Performed:     BP (!) 156/77   Pulse 99   Temp 97.2 °F (36.2 °C)   Resp 16   Ht 5' 5\" (1.651 m)   Wt 132 lb 8 oz (60.1 kg)   SpO2 99%   BMI 22.05 kg/m²     General appearance:  Pleasant, elderly female in no apparent distress, appears stated age and cooperative. HEENT: Pupils equal, round, and reactive to light. Extra ocular muscles intact. Conjunctivae/corneas clear. Neck: Supple, with full range of motion. No jugular venous distention. Trachea midline. Respiratory:  Normal respiratory effort. Clear to auscultation, bilaterally without Rales/Wheezes/Rhonchi. Cardiovascular:  Regular rate and rhythm with normal S1/S2 without murmurs, rubs or gallops. Abdomen: Soft, non-tender, non-distended with normal bowel sounds.   Musculoskeletal:  No clubbing, cyanosis or edema bilaterally. Full range of motion without deformity. Skin: Skin color, texture, turgor normal.  No significant rashes or lesions. Neurologic:  Neurovascularly intact Cranial nerves: II-XII intact, grossly non-focal.  Psychiatric:  Alert and oriented, thought content appropriate, normal insight  Capillary Refill: Brisk,3 seconds, normal  Peripheral Pulses: +2 palpable, equal bilaterally       Labs: For convenience and continuity at follow-up the following most recent labs are provided:      CBC:    Lab Results   Component Value Date/Time    WBC 3.9 10/15/2022 04:42 AM    HGB 12.1 10/15/2022 04:42 AM    HCT 36.2 10/15/2022 04:42 AM     10/15/2022 04:42 AM       Renal:    Lab Results   Component Value Date/Time     10/15/2022 04:42 AM    K 3.9 10/15/2022 04:42 AM     10/15/2022 04:42 AM    CO2 23 10/15/2022 04:42 AM    BUN 27 10/15/2022 04:42 AM    CREATININE 1.3 10/15/2022 04:42 AM    CALCIUM 8.9 10/15/2022 04:42 AM         Significant Diagnostic Studies    Radiology:   XR CHEST PORTABLE   Final Result   No acute cardiopulmonary disease.                 Consults:     IP CONSULT TO HOSPITALIST  IP CONSULT TO CARDIOLOGY    Disposition:  home     Condition at Discharge: Stable    Discharge Instructions/Follow-up:  PCP as outpt    Code Status:  Prior     Activity: activity as tolerated    Diet: regular diet      Discharge Medications:     Discharge Medication List as of 10/16/2022  5:41 PM             Details   guaiFENesin (MUCINEX) 600 MG extended release tablet Take 1 tablet by mouth 2 times daily for 3 days, Disp-6 tablet, R-0Normal      benzonatate (TESSALON) 100 MG capsule Take 1 capsule by mouth 3 times daily as needed for Cough, Disp-15 capsule, R-0Normal                Details   metoprolol succinate (TOPROL XL) 25 MG extended release tablet Take 1 tablet by mouth in the morning and 1 tablet in the evening., Disp-60 tablet, R-1Normal                Details oxybutynin (DITROPAN XL) 5 MG extended release tablet Take 1 tablet by mouth daily, Disp-7 tablet, R-0Print      Multiple Vitamins-Minerals (THERAPEUTIC MULTIVITAMIN-MINERALS) tablet Take 1 tablet by mouth daily      ELIQUIS 2.5 MG TABS tablet TK 1 T PO BID, R-6, JOSE      CRESTOR 10 MG tablet TK 1 T PO QOD (pt states she takes 3-4 tabs per week), R-2, JOSE      acetaminophen 650 MG TABS Take 650 mg by mouth every 4 hours as needed, Disp-120 tablet, R-0      lisinopril (PRINIVIL;ZESTRIL) 10 MG tablet Take 10 mg by mouth dailyHistorical Med             Time Spent on discharge is more than 30 minutes in the examination, evaluation, counseling and review of medications and discharge plan. Signed:    Iglesia Olson MD   10/18/2022      Thank you Stephanie Mar MD for the opportunity to be involved in this patient's care. If you have any questions or concerns, please feel free to contact me at [phone].

## 2022-10-16 NOTE — PROGRESS NOTES
Shift assessment updated. Patient a/ox4. VSS. Rm air. Encouraged IS use, patient verbalized understanding. Patient up to chair. Tolerated breakfast. Chair alarm on, call light in reach. Will monitor.

## 2022-10-16 NOTE — PROGRESS NOTES
Pt assessment completed and charted. VSS. Pt a/o x4. Explained to pt to turn throughout the night. Medications given per MAR. Pt is standby to bathroom. Bed in lowest position and wheels locked. Call light within reach. Bedside table within reach. Non-skid footwear in place. Pt denies any other needs at this time. Pt calls out appropriately. Will continue to monitor.

## 2022-10-16 NOTE — CARE COORDINATION
Spoke to pt re potential dc this evening. Reviewed PT/OT. She states that her son can come pick her up, she has no dc needs, and feels eager to get home.  Informed MD.

## 2022-10-16 NOTE — PROGRESS NOTES
Physical Therapy  Facility/Department: [facility] C3 TELE/MED SURG/ONC  Physical Therapy Initial Assessment/Discharge    Name: Keri Rosado  : 1928  MRN: [id number]  Date of Service: 10/16/2022    Discharge Recommendations:  Home with assist PRN   PT Equipment Recommendations  Equipment Needed: No      Patient Diagnosis(es): The primary encounter diagnosis was SOB (shortness of breath). Diagnoses of Troponin I above reference range and Orthostasis were also pertinent to this visit. Past Medical History:  has a past medical history of A-fib [facility]), Achilles tendon injury, Arthritis, Coronary artery disease involving native coronary artery of native heart without angina pectoris, Hyperlipidemia, Hypertension, Kidney insufficiency, MI, old, PONV (postoperative nausea and vomiting), Urinary frequency, and UTI (urinary tract infection). Past Surgical History:  has a past surgical history that includes Coronary angioplasty with stent (); hip surgery (Left, 2016); Carpal tunnel release (Right, ); back surgery (); Hysterectomy (); and Achilles tendon surgery (2017). Assessment   Body Structures, Functions, Activity Limitations Requiring Skilled Therapeutic Intervention: Decreased endurance  Assessment: pt presents to [facility] with primary diagnosis of shortness of breath. PTa pt was living at home with her son, 1 HAROLDO, reports IND with transfers and ambulation no AD, performed all home making and self care activities for herself and her son, only needs her son's help to get rides to grocery store. currently pt requries SUP for transfers and ambulation up to 65 feet with no AD, no path deviations or unsteadiness on feet, normal gait speed. pt has no notable PT needs at this time, pt only being seen for initial evaluation. recommend DC home with assist PRN. Treatment Diagnosis: decreased endurance. Therapy Prognosis: Good  Decision Making: Low Complexity  No Skilled PT:  At baseline function  Requires PT Follow-Up: No  Activity Tolerance  Activity Tolerance: Patient tolerated evaluation without incident     Plan   Physcial Therapy Plan  General Plan: Discharge with evaluation only  Safety Devices  Type of Devices: Chair alarm in place, Left in chair, Call light within reach, Nurse notified  Restraints  Restraints Initially in Place: No     Restrictions  Restrictions/Precautions  Restrictions/Precautions: Fall Risk, General Precautions  Position Activity Restriction  Other position/activity restrictions: telemetry     Subjective   Pain: pt denies pain at this time. General  Chart Reviewed: Yes  Patient assessed for rehabilitation services?: Yes  Family / Caregiver Present: No  Referring Practitioner: Quirino Watson MD  Referral Date : 10/15/22  Diagnosis: shortness of breath. Follows Commands: Within Functional Limits  Subjective  Subjective: pt agreeable to PT initial evaluation. Social/Functional History  Social/Functional History  Lives With: Son (son works day shift.)  Type of Home: House  Home Layout: One level  Home Access: Stairs to enter without rails  Entrance Stairs - Number of Steps: 1  Bathroom Shower/Tub: Tub/Shower unit  Bathroom Toilet: Handicap height  Bathroom Equipment: Tub transfer bench  Home Equipment: ramón Evans (RW that belonged to  in garage, does not use an AD to ambulate.   has a bedside commode from when she broke her hip, does not use currently.)  Has the patient had two or more falls in the past year or any fall with injury in the past year?: No  Receives Help From: Family (son drives pt to grocery store on saturday.)  ADL Assistance: Independent  Homemaking Responsibilities: Yes  Meal Prep Responsibility: Primary  Laundry Responsibility: Primary  Cleaning Responsibility: Primary  Bill Paying/Finance Responsibility: Primary  Shopping Responsibility: Primary  Ambulation Assistance: Independent (no AD.)  Transfer Assistance: Independent  Active tolerated  Gait  Overall Level of Assistance: Supervision (no AD.)  Gait Abnormalities: Decreased step clearance  Distance (ft): 65 Feet (no AD, making trips around room.)  Assistive Device: Other (comment) (none.)     AM-PAC Score  AM-PAC Inpatient Mobility Raw Score : 23 (10/16/22 1715)  AM-PAC Inpatient T-Scale Score : 56.93 (10/16/22 1715)  Mobility Inpatient CMS 0-100% Score: 11.2 (10/16/22 1715)  Mobility Inpatient CMS G-Code Modifier : CI (10/16/22 1715)     Goals  Short Term Goals  Time Frame for Short Term Goals: 10/16  Short Term Goal 1: pt will demonstrate SUP or better with functional transfers and ambulation. -10/16 GOAL MET  Patient Goals   Patient Goals : \"I want to go back to my house. \"       Education  Patient Education  Education Given To: Patient  Education Provided: Role of Therapy;Transfer Training;Plan of Care; Fall Prevention Strategies  Education Method: Demonstration;Verbal  Barriers to Learning: None  Education Outcome: Verbalized understanding;Demonstrated understanding      Therapy Time   Individual Concurrent Group Co-treatment   Time In 1028         Time Out 1106         Minutes 38         Timed Code Treatment Minutes: 28 Minutes, 10 minutes for initial evaluation.        Joaquin Hutson, PT, DPT

## 2022-10-16 NOTE — CONSULTS
[address] [place] [address]  [phone]        Reason for Consultation/Chief Complaint: \"I have been having feeling bad and runny nose . \"  Consult for dizziness, transient hypotension at home, troponin bump, ? Need inpt w/u  DNR-CCA    History of Present Illness:    Aaron Bee is a 80 y.o. patient who presented to [facility] [place], operated by [facility] 10/14/22 with c/o congestion, runny nose, and generalized malaise. She has PMH afib unspecified type on low dose eliquis, HLD, HTN, CRI, remote MI, reported CAD and stents prior (no specifics), UTI's, and s/p left hip pinning 2016. No regular cardiologist. PCP Dr. Karolyn Walker. Toma nuc 7/19/21 normal; ECHO 8/20/18 EF=55-60%; no WMA; nml LV size/thickness; mod MAC; mild MR; Carotid doppler 8/18 <50% stenoses bilaterally   Now presents with malaise, throat congestion, and runny nose for 1 week. Also reports anorexia with weight loss. Diagnosed with URI likely viral. Noted mild elevated Jeronimo for which cardiology consulted. CXR negative Orthostatics negative. EKG afib 101; anteroseptal infarct; left axis (EKG 7/21 SB, 1st AVB). Labs: Jeronimo 0.03, 0.03, 0.02; DXB=4287; BUN/Cr=36/1.6, K+ 4.3; nml LFT's; negative flu/Covid; H/H=12.7/38.2. Patient with no complaints of chest pain, SOB, palpitations, dizziness, edema, or orthopnea/PND. I have been asked to provide consultation regarding further management and testing. Past Medical History:   has a past medical history of A-fib ([address] Achilles tendon injury, Arthritis, Hyperlipidemia, Hypertension, Kidney insufficiency, MI, old, PONV (postoperative nausea and vomiting), Urinary frequency, and UTI (urinary tract infection). Surgical History:   has a past surgical history that includes Coronary angioplasty with stent (2006); hip surgery (Left, 1/21/2016); Carpal tunnel release (Right, 2002); back surgery (2003); Hysterectomy (1965); and Achilles tendon surgery (03/02/2017). Social History:   reports that she has never smoked.  She has never used smokeless tobacco. She reports that she does not drink alcohol and does not use drugs. Family History:  family history is negative for significant heart disease in parents    Home Medications:  Were reviewed and are listed in nursing record. and/or listed below  Prior to Admission medications    Medication Sig Start Date End Date Taking?  Authorizing Provider   metoprolol tartrate (LOPRESSOR) 25 MG tablet Take 25 mg by mouth 2 times daily   Yes Historical Provider, MD   oxybutynin (DITROPAN XL) 5 MG extended release tablet Take 1 tablet by mouth daily 3/11/17   Ellie Dent, APRN - CNP   Multiple Vitamins-Minerals (THERAPEUTIC MULTIVITAMIN-MINERALS) tablet Take 1 tablet by mouth daily    Historical Provider, MD   ELIQUIS 2.5 MG TABS tablet TK 1 T PO BID 8/13/16   Historical Provider, MD   CRESTOR 10 MG tablet TK 1 T PO QOD (pt states she takes 3-4 tabs per week) 8/13/16   Historical Provider, MD   acetaminophen 650 MG TABS Take 650 mg by mouth every 4 hours as needed 1/24/16   CHRISTIANE Watson   lisinopril (PRINIVIL;ZESTRIL) 10 MG tablet Take 10 mg by mouth daily    Historical Provider, MD        Allergies:  Epinephrine and Other     Review of Systems:   12 point ROS negative in all areas as listed below except as in [place]  Constitutional, EENT, Cardiovascular, pulmonary, GI, , Musculoskeletal, skin, neurological, hematological, endocrine, Psychiatric    Physical Examination:    Vitals:    10/16/22 1200   BP: 118/71   Pulse: 95   Resp: 16   Temp: 97.2 °F (36.2 °C)   SpO2: 98%    Weight: 132 lb 8 oz (60.1 kg)         General Appearance:  Alert, cooperative, no distress, appears stated age   Head:  Normocephalic, without obvious abnormality, atraumatic   Eyes:  PERRL, conjunctiva/corneas clear       Nose: Nares normal, no drainage or sinus tenderness   Throat: Lips, mucosa, and tongue normal   Neck: Supple, symmetrical, trachea midline, no adenopathy, thyroid: not enlarged, symmetric, no tenderness/mass/nodules, no carotid bruit or JVD       Lungs:   Clear to auscultation bilaterally, respirations unlabored   Chest Wall:  No tenderness or deformity   Heart:  +irregularly irregular, S1, S2 normal, no murmur, rub or gallop   Abdomen:   Soft, non-tender, bowel sounds active all four quadrants,  no masses, no organomegaly           Extremities: Extremities normal, atraumatic, no cyanosis or edema   Pulses: 2+ and symmetric   Skin: Skin color, texture, turgor normal, no rashes or lesions   Pysch: Normal mood and affect   Neurologic: Normal gross motor and sensory exam.         Labs  CBC:   Lab Results   Component Value Date/Time    WBC 3.9 10/15/2022 04:42 AM    RBC 3.96 10/15/2022 04:42 AM    HGB 12.1 10/15/2022 04:42 AM    HCT 36.2 10/15/2022 04:42 AM    MCV 91.4 10/15/2022 04:42 AM    RDW 14.1 10/15/2022 04:42 AM     10/15/2022 04:42 AM     CMP:    Lab Results   Component Value Date/Time     10/15/2022 04:42 AM    K 3.9 10/15/2022 04:42 AM     10/15/2022 04:42 AM    CO2 23 10/15/2022 04:42 AM    BUN 27 10/15/2022 04:42 AM    CREATININE 1.3 10/15/2022 04:42 AM    GFRAA 46 10/15/2022 04:42 AM    AGRATIO 1.2 10/14/2022 06:54 PM    LABGLOM 38 10/15/2022 04:42 AM    GLUCOSE 98 10/15/2022 04:42 AM    PROT 7.1 10/14/2022 06:54 PM    CALCIUM 8.9 10/15/2022 04:42 AM    BILITOT <0.2 10/14/2022 06:54 PM    ALKPHOS 85 10/14/2022 06:54 PM    AST 23 10/14/2022 06:54 PM    ALT 14 10/14/2022 06:54 PM     PT/INR:  No results found for: PTINR  Lab Results   Component Value Date    TROPONINI 0.02 (H) 10/15/2022       EKG:  I have reviewed EKG with the following interpretation:  Impression:  See HPI    ECHO 8/20/18 Summary   Normal left ventricular systolic function with ejection fraction of 55-60%. No regional wall motion abnormalites are seen. Normal size left ventricle and wall thickness. Diastolic filling pressure cannot be determined. Mild thickening of leaflets of mitral valve.    Moderate mitral annular calcification. Mild mitral regurgitation. No intracardiac mass vegetations or thrombi. Toma nuc 7/19/21  Summary    Normal LV function. There is uniform isotope uptake at stress and rest. There is no evidence of    myocardial ischemia or scar. Assessment:  Matteo Lindo is a 80 y.o. patient who presented to [facility] 10/14/22 with c/o congestion, runny nose, and generalized malaise. She has PMH afib unspecified type on low dose eliquis, HLD, HTN, CRI, remote MI, reported CAD and stents prior (no specifics), UTI's, and s/p left hip pinning 2016. No regular cardiologist. PCP Dr. Jessica Gregory. Toma nuc 7/19/21 normal; ECHO 8/20/18 EF=55-60%; no WMA; nml LV size/thickness; mod MAC; mild MR; Carotid doppler 8/18 <50% stenoses bilaterally   Now presents with malaise, throat congestion, and runny nose for 1 week. Also reports anorexia with weight loss. Diagnosed with URI likely viral. Noted mild elevated Jeronimo for which cardiology consulted. CXR negative Orthostatics negative. EKG afib 101; anteroseptal infarct; left axis (EKG 7/21 SB, 1st AVB). Labs: Jeronimo 0.03, 0.03, 0.02; OEA=9026; BUN/Cr=36/1.6, K+ 4.3; nml LFT's; negative flu/Covid; H/H=12.7/38.2. Diagnosis of mild elevated Jeronimo in elderly female with URI, CRI, hx afib, and remote CAD s/p stents. Recs:  Low clinical suspicion for ACS. No serial increase, no anginal symptoms, no ischemic ST change, and renal insuffficiency along with infxn can explain mild rise in enzymes  No need for cardiac testing at this time. Continue eliquis 2.5mg BID, lisinopril 10mg qd, Toprol XL 25mg BID, ditropan 5mg qd, crestor 10mg qd    OK for d/c home from cardiac standpoint whenever IM doc feels ready.    Signing off    Patient Active Problem List   Diagnosis    Hypertension    Hyperlipidemia    Atrial fibrillation (HCC)    Arthritis    Closed left hip fracture (HCC)    Achilles tendon injury    TIA (transient ischemic attack)    Dizziness    Chest pain    URI with cough and congestion       Thank you for allowing to us to participate in the [facility] or [address] [place], [place] [address]y 20. Further evaluation will be based upon the patient's clinical course and testing results.

## 2022-10-16 NOTE — PROGRESS NOTES
Iv tele/removed for discharge. Discharge instructions given to patient and son, verbalized understanding. Patient take by wheelchair to vehicle.

## 2022-11-15 PROBLEM — R79.89 ELEVATED TROPONIN: Status: RESOLVED | Noted: 2022-10-16 | Resolved: 2022-11-15

## 2022-11-15 PROBLEM — R77.8 ELEVATED TROPONIN: Status: RESOLVED | Noted: 2022-10-16 | Resolved: 2022-11-15

## 2023-06-01 ENCOUNTER — HOSPITAL ENCOUNTER (OUTPATIENT)
Age: 88
Setting detail: OBSERVATION
Discharge: HOME | End: 2023-06-03
Attending: EMERGENCY MEDICINE | Admitting: STUDENT IN AN ORGANIZED HEALTH CARE EDUCATION/TRAINING PROGRAM
Payer: MEDICARE

## 2023-06-01 ENCOUNTER — APPOINTMENT (OUTPATIENT)
Dept: CT IMAGING | Age: 88
End: 2023-06-01
Payer: MEDICARE

## 2023-06-01 DIAGNOSIS — I63.9 CEREBROVASCULAR ACCIDENT (CVA), UNSPECIFIED MECHANISM (HCC): Primary | ICD-10-CM

## 2023-06-01 DIAGNOSIS — N17.9 AKI (ACUTE KIDNEY INJURY) (HCC): ICD-10-CM

## 2023-06-01 DIAGNOSIS — H53.8 BLURRED VISION: ICD-10-CM

## 2023-06-01 LAB
ANION GAP SERPL CALCULATED.3IONS-SCNC: 12 MMOL/L (ref 3–16)
BASOPHILS # BLD: 0 K/UL (ref 0–0.2)
BASOPHILS NFR BLD: 0.7 %
BUN SERPL-MCNC: 36 MG/DL (ref 7–20)
CALCIUM SERPL-MCNC: 9.2 MG/DL (ref 8.3–10.6)
CHLORIDE SERPL-SCNC: 105 MMOL/L (ref 99–110)
CO2 SERPL-SCNC: 23 MMOL/L (ref 21–32)
CREAT SERPL-MCNC: 1.9 MG/DL (ref 0.6–1.2)
DEPRECATED RDW RBC AUTO: 14.2 % (ref 12.4–15.4)
EKG ATRIAL RATE: 326 BPM
EKG DIAGNOSIS: NORMAL
EKG Q-T INTERVAL: 368 MS
EKG QRS DURATION: 100 MS
EKG QTC CALCULATION (BAZETT): 462 MS
EKG R AXIS: 235 DEGREES
EKG T AXIS: 185 DEGREES
EKG VENTRICULAR RATE: 95 BPM
EOSINOPHIL # BLD: 0 K/UL (ref 0–0.6)
EOSINOPHIL NFR BLD: 0.8 %
GFR SERPLBLD CREATININE-BSD FMLA CKD-EPI: 24 ML/MIN/{1.73_M2}
GLUCOSE SERPL-MCNC: 123 MG/DL (ref 70–99)
HCT VFR BLD AUTO: 36.3 % (ref 36–48)
HGB BLD-MCNC: 12.1 G/DL (ref 12–16)
INR PPP: 1.12 (ref 0.84–1.16)
LYMPHOCYTES # BLD: 1.7 K/UL (ref 1–5.1)
LYMPHOCYTES NFR BLD: 30.7 %
MCH RBC QN AUTO: 30.8 PG (ref 26–34)
MCHC RBC AUTO-ENTMCNC: 33.3 G/DL (ref 31–36)
MCV RBC AUTO: 92.4 FL (ref 80–100)
MONOCYTES # BLD: 0.7 K/UL (ref 0–1.3)
MONOCYTES NFR BLD: 12.4 %
NEUTROPHILS # BLD: 3.1 K/UL (ref 1.7–7.7)
NEUTROPHILS NFR BLD: 55.4 %
PLATELET # BLD AUTO: 190 K/UL (ref 135–450)
PMV BLD AUTO: 6.7 FL (ref 5–10.5)
POTASSIUM SERPL-SCNC: 4.1 MMOL/L (ref 3.5–5.1)
PROTHROMBIN TIME: 14.4 SEC (ref 11.5–14.8)
RBC # BLD AUTO: 3.93 M/UL (ref 4–5.2)
SODIUM SERPL-SCNC: 140 MMOL/L (ref 136–145)
TROPONIN, HIGH SENSITIVITY: 26 NG/L (ref 0–14)
WBC # BLD AUTO: 5.5 K/UL (ref 4–11)

## 2023-06-01 PROCEDURE — 80048 BASIC METABOLIC PNL TOTAL CA: CPT

## 2023-06-01 PROCEDURE — 93010 ELECTROCARDIOGRAM REPORT: CPT | Performed by: INTERNAL MEDICINE

## 2023-06-01 PROCEDURE — 70450 CT HEAD/BRAIN W/O DYE: CPT

## 2023-06-01 PROCEDURE — 85025 COMPLETE CBC W/AUTO DIFF WBC: CPT

## 2023-06-01 PROCEDURE — G0378 HOSPITAL OBSERVATION PER HR: HCPCS

## 2023-06-01 PROCEDURE — 84484 ASSAY OF TROPONIN QUANT: CPT

## 2023-06-01 PROCEDURE — 96361 HYDRATE IV INFUSION ADD-ON: CPT

## 2023-06-01 PROCEDURE — 85610 PROTHROMBIN TIME: CPT

## 2023-06-01 PROCEDURE — 2580000003 HC RX 258: Performed by: EMERGENCY MEDICINE

## 2023-06-01 PROCEDURE — 93005 ELECTROCARDIOGRAM TRACING: CPT | Performed by: EMERGENCY MEDICINE

## 2023-06-01 PROCEDURE — 99285 EMERGENCY DEPT VISIT HI MDM: CPT

## 2023-06-01 RX ORDER — 0.9 % SODIUM CHLORIDE 0.9 %
1000 INTRAVENOUS SOLUTION INTRAVENOUS ONCE
Status: COMPLETED | OUTPATIENT
Start: 2023-06-01 | End: 2023-06-02

## 2023-06-01 RX ADMIN — SODIUM CHLORIDE 1000 ML: 9 INJECTION, SOLUTION INTRAVENOUS at 23:18

## 2023-06-01 ASSESSMENT — LIFESTYLE VARIABLES
HOW MANY STANDARD DRINKS CONTAINING ALCOHOL DO YOU HAVE ON A TYPICAL DAY: PATIENT DOES NOT DRINK
HOW OFTEN DO YOU HAVE A DRINK CONTAINING ALCOHOL: NEVER

## 2023-06-01 ASSESSMENT — PAIN SCALES - GENERAL
PAINLEVEL_OUTOF10: 0
PAINLEVEL_OUTOF10: 0

## 2023-06-01 ASSESSMENT — PAIN - FUNCTIONAL ASSESSMENT: PAIN_FUNCTIONAL_ASSESSMENT: 0-10

## 2023-06-01 NOTE — ED PROVIDER NOTES
inversion present in lead I, aVL, incomplete right bundle branch block  No evidence of acute ischemia. No significant change from prior EKG dated 10/14/22      RADIOLOGY:   Non-plain film images such as CT, Ultrasound and MRI are read by the radiologist. Plain radiographic images are visualized and preliminarily interpreted by the ED Provider with the below findings:        Interpretation per the Radiologist below, if available at the time of this note:    CT HEAD WO CONTRAST   Final Result   1. No acute intracranial abnormality. No results found. Bedside Ultrasound, as interpreted by me, if performed:    No results found. PROCEDURES     Unless otherwise noted below, none     Procedures    CRITICAL CARE TIME     I personally spent a total of 35 minutes of critical care time in obtaining history, performing a physical exam, bedside monitoring of interventions, collecting and interpreting tests and discussion with consultants but excluding time spent performing procedures, treating other patients and teaching time. PAST MEDICAL HISTORY      has a past medical history of A-fib ([facility] [address] Achilles tendon injury (8/23/2016), Arthritis, Coronary artery disease involving native coronary artery of native heart without angina pectoris (10/16/2022), Hyperlipidemia, Hypertension, Kidney insufficiency, MI, old, PONV (postoperative nausea and vomiting), Urinary frequency, and UTI (urinary tract infection) (8/19/2018).      EMERGENCY DEPARTMENT COURSE and DIFFERENTIAL DIAGNOSIS/MDM:     Vitals:    Vitals:    06/01/23 1635   BP: 138/66   Pulse: 89   Resp: 16   Temp: 98.9 °F (37.2 °C)   TempSrc: Oral   SpO2: 97%   Weight: 128 lb (58.1 kg)   Height: 5' 5\" (1.651 m)       Patient was treated with and given the following medications:  Medications   0.9 % sodium chloride bolus (has no administration in time range)

## 2023-06-02 ENCOUNTER — APPOINTMENT (OUTPATIENT)
Dept: MRI IMAGING | Age: 88
End: 2023-06-02
Payer: MEDICARE

## 2023-06-02 ENCOUNTER — APPOINTMENT (OUTPATIENT)
Dept: VASCULAR LAB | Age: 88
End: 2023-06-02
Payer: MEDICARE

## 2023-06-02 LAB
ANION GAP SERPL CALCULATED.3IONS-SCNC: 7 MMOL/L (ref 3–16)
BACTERIA URNS QL MICRO: ABNORMAL /HPF
BILIRUB UR QL STRIP.AUTO: NEGATIVE
BUN SERPL-MCNC: 32 MG/DL (ref 7–20)
CALCIUM SERPL-MCNC: 8.8 MG/DL (ref 8.3–10.6)
CHLORIDE SERPL-SCNC: 110 MMOL/L (ref 99–110)
CHOLEST SERPL-MCNC: 153 MG/DL (ref 0–199)
CLARITY UR: ABNORMAL
CO2 SERPL-SCNC: 23 MMOL/L (ref 21–32)
COLOR UR: YELLOW
CREAT SERPL-MCNC: 1.4 MG/DL (ref 0.6–1.2)
CRP SERPL-MCNC: <3 MG/L (ref 0–5.1)
DEPRECATED RDW RBC AUTO: 14 % (ref 12.4–15.4)
ERYTHROCYTE [SEDIMENTATION RATE] IN BLOOD BY WESTERGREN METHOD: 5 MM/HR (ref 0–30)
EST. AVERAGE GLUCOSE BLD GHB EST-MCNC: 111.2 MG/DL
GFR SERPLBLD CREATININE-BSD FMLA CKD-EPI: 35 ML/MIN/{1.73_M2}
GLUCOSE SERPL-MCNC: 89 MG/DL (ref 70–99)
GLUCOSE UR STRIP.AUTO-MCNC: NEGATIVE MG/DL
HBA1C MFR BLD: 5.5 %
HCT VFR BLD AUTO: 38.8 % (ref 36–48)
HDLC SERPL-MCNC: 58 MG/DL (ref 40–60)
HGB BLD-MCNC: 12.8 G/DL (ref 12–16)
HGB UR QL STRIP.AUTO: ABNORMAL
KETONES UR STRIP.AUTO-MCNC: NEGATIVE MG/DL
LDLC SERPL CALC-MCNC: 84 MG/DL
LEUKOCYTE ESTERASE UR QL STRIP.AUTO: ABNORMAL
MCH RBC QN AUTO: 30.5 PG (ref 26–34)
MCHC RBC AUTO-ENTMCNC: 33 G/DL (ref 31–36)
MCV RBC AUTO: 92.3 FL (ref 80–100)
NITRITE UR QL STRIP.AUTO: POSITIVE
PH UR STRIP.AUTO: 7 [PH] (ref 5–8)
PLATELET # BLD AUTO: 154 K/UL (ref 135–450)
PMV BLD AUTO: 6.9 FL (ref 5–10.5)
POTASSIUM SERPL-SCNC: 3.7 MMOL/L (ref 3.5–5.1)
PROT UR STRIP.AUTO-MCNC: ABNORMAL MG/DL
RBC # BLD AUTO: 4.21 M/UL (ref 4–5.2)
RBC #/AREA URNS HPF: ABNORMAL /HPF (ref 0–4)
SODIUM SERPL-SCNC: 140 MMOL/L (ref 136–145)
SP GR UR STRIP.AUTO: 1.01 (ref 1–1.03)
TRIGL SERPL-MCNC: 53 MG/DL (ref 0–150)
TROPONIN, HIGH SENSITIVITY: 26 NG/L (ref 0–14)
TROPONIN, HIGH SENSITIVITY: 27 NG/L (ref 0–14)
UA DIPSTICK W REFLEX MICRO PNL UR: YES
URN SPEC COLLECT METH UR: ABNORMAL
UROBILINOGEN UR STRIP-ACNC: 0.2 E.U./DL
VLDLC SERPL CALC-MCNC: 11 MG/DL
WBC # BLD AUTO: 4.7 K/UL (ref 4–11)
WBC #/AREA URNS HPF: ABNORMAL /HPF (ref 0–5)

## 2023-06-02 PROCEDURE — 6360000002 HC RX W HCPCS: Performed by: NURSE PRACTITIONER

## 2023-06-02 PROCEDURE — 87077 CULTURE AEROBIC IDENTIFY: CPT

## 2023-06-02 PROCEDURE — 87186 SC STD MICRODIL/AGAR DIL: CPT

## 2023-06-02 PROCEDURE — 2580000003 HC RX 258: Performed by: NURSE PRACTITIONER

## 2023-06-02 PROCEDURE — 6370000000 HC RX 637 (ALT 250 FOR IP): Performed by: NURSE PRACTITIONER

## 2023-06-02 PROCEDURE — 93880 EXTRACRANIAL BILAT STUDY: CPT

## 2023-06-02 PROCEDURE — 83036 HEMOGLOBIN GLYCOSYLATED A1C: CPT

## 2023-06-02 PROCEDURE — 85652 RBC SED RATE AUTOMATED: CPT

## 2023-06-02 PROCEDURE — 84484 ASSAY OF TROPONIN QUANT: CPT

## 2023-06-02 PROCEDURE — 36415 COLL VENOUS BLD VENIPUNCTURE: CPT

## 2023-06-02 PROCEDURE — 87086 URINE CULTURE/COLONY COUNT: CPT

## 2023-06-02 PROCEDURE — 81001 URINALYSIS AUTO W/SCOPE: CPT

## 2023-06-02 PROCEDURE — 92610 EVALUATE SWALLOWING FUNCTION: CPT

## 2023-06-02 PROCEDURE — G0378 HOSPITAL OBSERVATION PER HR: HCPCS

## 2023-06-02 PROCEDURE — 96361 HYDRATE IV INFUSION ADD-ON: CPT

## 2023-06-02 PROCEDURE — 86140 C-REACTIVE PROTEIN: CPT

## 2023-06-02 PROCEDURE — 80061 LIPID PANEL: CPT

## 2023-06-02 PROCEDURE — 70551 MRI BRAIN STEM W/O DYE: CPT

## 2023-06-02 PROCEDURE — 85027 COMPLETE CBC AUTOMATED: CPT

## 2023-06-02 PROCEDURE — 96365 THER/PROPH/DIAG IV INF INIT: CPT

## 2023-06-02 PROCEDURE — 80048 BASIC METABOLIC PNL TOTAL CA: CPT

## 2023-06-02 RX ORDER — ONDANSETRON 4 MG/1
4 TABLET, ORALLY DISINTEGRATING ORAL EVERY 8 HOURS PRN
Status: DISCONTINUED | OUTPATIENT
Start: 2023-06-02 | End: 2023-06-03 | Stop reason: HOSPADM

## 2023-06-02 RX ORDER — ROSUVASTATIN CALCIUM 10 MG/1
40 TABLET, COATED ORAL NIGHTLY
Status: DISCONTINUED | OUTPATIENT
Start: 2023-06-02 | End: 2023-06-03 | Stop reason: HOSPADM

## 2023-06-02 RX ORDER — POLYETHYLENE GLYCOL 3350 17 G/17G
17 POWDER, FOR SOLUTION ORAL DAILY PRN
Status: DISCONTINUED | OUTPATIENT
Start: 2023-06-02 | End: 2023-06-03 | Stop reason: HOSPADM

## 2023-06-02 RX ORDER — ONDANSETRON 2 MG/ML
4 INJECTION INTRAMUSCULAR; INTRAVENOUS EVERY 6 HOURS PRN
Status: DISCONTINUED | OUTPATIENT
Start: 2023-06-02 | End: 2023-06-03 | Stop reason: HOSPADM

## 2023-06-02 RX ORDER — ACETAMINOPHEN 325 MG/1
650 TABLET ORAL EVERY 4 HOURS PRN
Status: DISCONTINUED | OUTPATIENT
Start: 2023-06-02 | End: 2023-06-03 | Stop reason: HOSPADM

## 2023-06-02 RX ORDER — LISINOPRIL 10 MG/1
10 TABLET ORAL DAILY
Status: DISCONTINUED | OUTPATIENT
Start: 2023-06-02 | End: 2023-06-03 | Stop reason: HOSPADM

## 2023-06-02 RX ORDER — ASPIRIN 300 MG/1
300 SUPPOSITORY RECTAL DAILY
Status: DISCONTINUED | OUTPATIENT
Start: 2023-06-02 | End: 2023-06-03 | Stop reason: HOSPADM

## 2023-06-02 RX ORDER — ASPIRIN 81 MG/1
81 TABLET ORAL DAILY
Status: DISCONTINUED | OUTPATIENT
Start: 2023-06-02 | End: 2023-06-03 | Stop reason: HOSPADM

## 2023-06-02 RX ORDER — SODIUM CHLORIDE 9 MG/ML
INJECTION, SOLUTION INTRAVENOUS CONTINUOUS
Status: DISCONTINUED | OUTPATIENT
Start: 2023-06-02 | End: 2023-06-02

## 2023-06-02 RX ADMIN — APIXABAN 2.5 MG: 2.5 TABLET, FILM COATED ORAL at 08:27

## 2023-06-02 RX ADMIN — METOPROLOL TARTRATE 25 MG: 25 TABLET, FILM COATED ORAL at 20:48

## 2023-06-02 RX ADMIN — ASPIRIN 81 MG: 81 TABLET, COATED ORAL at 08:27

## 2023-06-02 RX ADMIN — APIXABAN 2.5 MG: 2.5 TABLET, FILM COATED ORAL at 00:29

## 2023-06-02 RX ADMIN — ROSUVASTATIN 40 MG: 10 TABLET, FILM COATED ORAL at 20:48

## 2023-06-02 RX ADMIN — METOPROLOL TARTRATE 25 MG: 25 TABLET, FILM COATED ORAL at 00:28

## 2023-06-02 RX ADMIN — LISINOPRIL 10 MG: 10 TABLET ORAL at 08:27

## 2023-06-02 RX ADMIN — ROSUVASTATIN 40 MG: 10 TABLET, FILM COATED ORAL at 00:28

## 2023-06-02 RX ADMIN — CEFTRIAXONE SODIUM 1000 MG: 1 INJECTION, POWDER, FOR SOLUTION INTRAMUSCULAR; INTRAVENOUS at 14:53

## 2023-06-02 RX ADMIN — METOPROLOL TARTRATE 25 MG: 25 TABLET, FILM COATED ORAL at 08:27

## 2023-06-02 RX ADMIN — SODIUM CHLORIDE: 9 INJECTION, SOLUTION INTRAVENOUS at 00:30

## 2023-06-02 NOTE — H&P
Hospital Medicine History & Physical      Date of Admission: 6/1/2023    Date of Service:  Pt seen/examined on 6/1/2023    []Admitted to Inpatient with expected LOS greater than two midnights due to medical therapy. [x]Placed in Observation status. Chief Admission Complaint: Left eye vision changes    Presenting Admission History:      80 y.o. female, with past medical history of TIA, hypertension, CAD, hyperlipidemia, renal insufficiency and atrial fibrillation, who presented to [facility] with left eye vision changes. History obtained from the patient and review of EMR. The patient stated she has had intermittent vision changes in the left eye for 1 week. She stated this morning she woke up around 7 AM with complete vision loss. She stated she was seeing black dots and then her vision went blurry. The patient stated around 11 AM her vision returned but was not at its baseline. She stated she called her doctor at [facility] and they recommended she go to the emergency department to rule out a stroke. In the emergency department a CT head was obtained that revealed no acute intracranial abnormality. CTA head and neck was not done due to patient's kidney function. Urinalysis has been ordered. The patient was given 1 L of normal saline. She was admitted for further evaluation and treatment. An MRI of the brain without contrast and carotid duplex have been ordered for the a.m. Neurology has also been consulted. The patient denied any other associated symptoms as well as any aggravating and/or alleviating factors. At the time of this assessment, the patient was resting comfortably in bed. she currently denies any chest pain, back pain, abdominal pain, shortness of breath, numbness, tingling, N/V/C/D, fever and/or chills.      Assessment/Plan:      TIA (transient ischemic attack)    TIA vs CVA - symptoms include left eye vision changes  -CT head revealed: No acute intracranial

## 2023-06-02 NOTE — PLAN OF CARE
All safety measures in place, bedalarm on, call light near. Pt has been up in chair 2 times today. Pain controlled.   Problem: Pain  Goal: Verbalizes/displays adequate comfort level or baseline comfort level  Outcome: Progressing     Problem: Safety - Adult  Goal: Free from fall injury  6/2/2023 1425 by Karla Lazaro RN  Outcome: Progressing  6/2/2023 0605 by Bety Bashir RN  Flowsheets (Taken 6/2/2023 0605)  Free From Fall Injury:   Instruct family/caregiver on patient safety   Based on caregiver fall risk screen, instruct family/caregiver to ask for assistance with transferring infant if caregiver noted to have fall risk factors     Problem: ABCDS Injury Assessment  Goal: Absence of physical injury  6/2/2023 1425 by Karla Lazaro RN  Outcome: Progressing  6/2/2023 0605 by Bety Bashir RN  Outcome: Progressing

## 2023-06-02 NOTE — PROGRESS NOTES
4 Eyes Skin Assessment     The patient is being assess for   Admission    I agree that 2 RN's have performed a thorough Head to Toe Skin Assessment on the patient. ALL assessment sites listed below have been assessed. Areas assessed for pressure by both nurses:   [x]   Head, Face, and Ears   [x]   Shoulders, Back, and Chest, Abdomen  [x]   Arms, Elbows, and Hands   [x]   Coccyx, Sacrum, and Ischium  [x]   Legs, Feet, and Heels        Skin Assessed Under all Medical Devices by both nurses: n/a         All Mepilex Borders were peeled back and area peeked at by both nurses:  n/a  Please list where Mepilex Borders are located:  n/a             **SHARE this note so that the co-signing nurse is able to place an eSignature**    Co-signer eSignature: {Esignature:[id number]}    Does the Patient have Skin Breakdown related to pressure?   No              Zane Prevention initiated:  No   Wound Care Orders initiated:  No      WOC nurse consulted for Pressure Injury (Stage 3,4, Unstageable, DTI, NWPT, Complex wounds)and New or Established Ostomies:  No      Primary Nurse eSignature: Electronically signed by Joesph Mejia RN on 6/2/23 at 3:34 AM EDT

## 2023-06-02 NOTE — PROGRESS NOTES
Pt arrived to [phone] in stable condition. VS WNL. Call light given, bed alarm on. Pt oriented to room.

## 2023-06-02 NOTE — CONSULTS
In patient Neurology consult        [facility] [address] [facility]      MD Yaritza Najera Darrelliker  2/1/1928    Date of Service: 6/2/2023    Referring Physician: Bakari Willis MD      Reason for the consult and CC: Acute left eye vision changes    HPI:   The patient is a 80 y.o.  female, with a PMH of HTN, HLD, A Fib on Eliquis, who presented to [facility] with a 1 week history of vision changes in the left eye. The patient notes a 1 week history of intermittent vision changes in her left eye. On the day of admission, she noticed the vision in her left eye was a big black spot. [facility], but was advised to come to the ED for evaluation. She was unable to have a CTA head/neck due to RY. She denies fever, chills, headache, dizziness, dysarthria, dysphagia, tinnitus, focal weakness. She complains of some paresthesias of the left shoulder. Constitutional:   Vitals:    06/01/23 1800 06/01/23 1930 06/01/23 2145 06/01/23 2319   BP: 138/88 (!) 142/61 (!) 141/68 136/66   Pulse: 88 86 88 84   Resp: 16   16   Temp: 98.6 °F (37 °C)   98.3 °F (36.8 °C)   TempSrc: Oral   Oral   SpO2: 98%  96% 99%   Weight:       Height:             I personally reviewed and updated social history, past medical history, medications, allergy, surgical history, and family history as documented in the patient's electronic health records. ROS: 10-14 ROS reviewed with the patient/nurse/family which were unremarkable except mentioned in H&P. General appearance:  Normal development and appear in no acute distress. Mental Status:   Oriented to person, place, problem, and time. Memory: Good immediate recall. Intact remote memory  Normal attention span and concentration. Language: intact naming, repeating and fluency   Good fund of Knowledge. Aware of current events and vocabulary   Cranial Nerves:   II: Visual fields: Full.  Pupils: equal, round, reactive to light,

## 2023-06-02 NOTE — PLAN OF CARE
Problem: ABCDS Injury Assessment  Goal: Absence of physical injury  Outcome: Progressing     Problem: Safety - Adult  Goal: Free from fall injury  Flowsheets (Taken 6/2/2023 0605)  Free From Fall Injury:   Instruct family/caregiver on patient safety   Based on caregiver fall risk screen, instruct family/caregiver to ask for assistance with transferring infant if caregiver noted to have fall risk factors

## 2023-06-02 NOTE — CONSULTS
Consult Placed     Who: Dr. Terry Taveras, Neurology  Date: 6/2/2023  Time: 7:53 AM     Electronically signed by Evgeny Richter on 6/2/2023 at 7:53 AM

## 2023-06-02 NOTE — PROGRESS NOTES
Speech Language Pathology  Clinical Bedside Swallow Assessment  Facility/Department: [facility] C5 - MED SURG/ORTHO    Recommendations:  Diet recommendation: IDDSI 7 Regular Solids; IDDSI 0 Thin Liquids; Meds whole with thin liquids  Instrumentation: Not clinically indicated at this time. Will continue to monitor. Risk management: upright for all intake, stay upright for at least 30 mins after intake, small bites/sips, alternate bites/sips, slow rate of intake, general GERD precautions, general aspiration precautions, and hold PO and contact SLP if s/s of aspiration or worsening respiratory status develop. NAME:Lubna Mobley  : 1928 (89 y.o.)   MRN: [id number]  ROOM: [address] [place] [address]  ADMISSION DATE: 2023  PATIENT DIAGNOSIS(ES): TIA (transient ischemic attack) [G45.9]  Blurred vision [H53.8]  RY (acute kidney injury) ([facility] [N17.9]  Cerebrovascular accident (CVA), unspecified mechanism ([facility] [I63.9]  Chief Complaint   Patient presents with    Loss of Vision     Black spots in vision for 1 week. Patient woke up this morning with complete vision loss @0700, @1100 vision returned but vision is still not the same. Patient family called [facility] and was told to come to ED. Patient also c/o of left arm numbness. Patient does take eliquis for afib.       Patient Active Problem List    Diagnosis Date Noted    Closed left hip fracture ([facility] 2016    Coronary artery disease involving native coronary artery of native heart without angina pectoris 10/16/2022    URI with cough and congestion 10/14/2022    Chest pain 2021    TIA (transient ischemic attack) 2018    Dizziness 2018    Achilles tendon injury 2016    Hypertension     Hyperlipidemia     Atrial fibrillation ([facility]     Arthritis      Past Medical History:   Diagnosis Date    A-fib [facility])     Achilles tendon injury 2016    Arthritis     Coronary artery disease involving native coronary artery of native heart without angina pectoris

## 2023-06-02 NOTE — PROGRESS NOTES
Hospital Medicine Progress Note      Date of Admission: 6/1/2023  Hospital Day: 2    Chief Admission Complaint:  Left eye visual changes     Subjective:  Patient is up in the chair when we arrive. She states she is doing well. Her vision has improved significantly and she has some N.T in left shoulder at times. Presenting Admission History: This is a 80 y.o. female, with past medical history of TIA, hypertension, CAD, hyperlipidemia, renal insufficiency and atrial fibrillation, who presented to [facility] with left eye vision changes. History obtained from the patient and review of EMR. The patient stated she has had intermittent vision changes in the left eye for 1 week. She stated this morning she woke up around 7 AM with complete vision loss. She stated she was seeing black dots and then her vision went blurry. The patient stated around 11 AM her vision returned but was not at its baseline. She stated she called her doctor at [facility] and they recommended she go to the emergency department to rule out a stroke      Assessment/Plan:      Current Principal Problem:  TIA (transient ischemic attack)     TIA vs CVA - symptoms include left eye vision changes  -CT head revealed: No acute intracranial abnormality  -UA with reflex to culture. + large flora of LE and Nitrites. Curine culture ordered as add on and spoke with microbiology  concerning this issue.   -1 L normal saline given in ED  -Neuro checks Q4 hour  -MRI brain without contrast revealed No acute intracranial abnormality. There is mild periventricular cerebral  white matter disease.  - Carotid duplex revealed The right internal carotid artery reveals a <50% diameter reducing stenosis according to velocity criteria. The right vertebral artery demonstrates normal antegrade flow. The right subclavian artery is visualized and demonstrates multiphasic flow. The left internal carotid artery reveals a <50% diameter

## 2023-06-02 NOTE — ACP (ADVANCE CARE PLANNING)
Advance Care Planning     General Advance Care Planning (ACP) Conversation    Date of Conversation: 6/1/2023  Conducted with:  Healthcare Decision Maker: Named in Advance Directive or Healthcare Power [facility]  (name) [facility] Decision Maker:    Primary Decision Maker: Leah Rubi - Child - [phone]  Click here to complete [address] including selection of the Healthcare Decision Maker Relationship (ie \"Primary\"). Today we documented Decision Maker(s). The patient will provide ACP documents. Content/Action Overview: Has ACP document(s) NOT on file - requested patient to provide  Reviewed DNR/DNI and patient confirms current DNR status - completed forms on file (place new order if needed)      ventilation preferences  Pt was admitted w/ a DNRCC-A code status. Have clarified w/ Hernán Diallo, pts dtr & HCPOA, that pt would be agreeable to short term intubation/MV support if that were necessary. We are not to provide cpr, however. Code status further amended to better clarify wishes around intubation.         Length of Voluntary ACP Conversation in minutes:  16 minutes                                                                                                                                                              Jonathan Rojas RN

## 2023-06-02 NOTE — CARE COORDINATION
Case Management Assessment  Initial Evaluation    Date/Time of Evaluation: 6/2/2023 12:14 PM  Assessment Completed by: Kashif Dos Santos RN    If patient is discharged prior to next notation, then this note serves as note for discharge by case management. Patient Name: Zhane Byrnes                   YOB: 1928  Diagnosis: TIA (transient ischemic attack) [G45.9]  Blurred vision [H53.8]  RY (acute kidney injury) ([facility] [N17.9]  Cerebrovascular accident (CVA), unspecified mechanism ([facility] [I63.9]                   Date / Time: 6/1/2023  4:26 PM    Patient Admission Status: Observation   Readmission Risk (Low < 19, Mod (19-27), High > 27): No data recorded  Current PCP: Pat Shukla MD  PCP verified by ? Yes    Chart Reviewed: Yes      History Provided by: Patient  Patient Orientation: Alert and Oriented    Patient Cognition: Alert    Hospitalization in the last 30 days (Readmission):  No    If yes, Readmission Assessment in  Navigator will be completed. Advance Directives:      Code Status: DNR-CCA   Patient's Primary Decision Maker is: Patient Declined (Legal Next of Kin Remains as Decision Maker)      Discharge Planning:    Patient lives with: Children Type of Home: House  Primary Care Giver: Self  Patient Support Systems include: Children   Current Financial resources: Medicare  Current community resources: None  Current services prior to admission: None            Current DME:              Type of Home Care services:  None    ADLS  Prior functional level: Independent in ADLs/IADLs  Current functional level:      PT AM-PAC:   /24  OT AM-PAC:   /24    Family can provide assistance at DC: Yes  Would you like Case Management to discuss the discharge plan with any other family members/significant others, and if so, who?  Yes (Sons)  Plans to Return to Present Housing: Yes  Other Identified Issues/Barriers to RETURNING to current housing: none  Potential Assistance needed at discharge: N/A

## 2023-06-02 NOTE — PLAN OF CARE
SLP completed evaluation. Please refer to notes in EMR.     Maru Rosa MA. [facility] [facility] Pathologist  [id number]

## 2023-06-03 VITALS
RESPIRATION RATE: 17 BRPM | TEMPERATURE: 98.3 F | OXYGEN SATURATION: 97 % | HEIGHT: 65 IN | SYSTOLIC BLOOD PRESSURE: 138 MMHG | DIASTOLIC BLOOD PRESSURE: 54 MMHG | HEART RATE: 98 BPM | WEIGHT: 128 LBS | BODY MASS INDEX: 21.33 KG/M2

## 2023-06-03 PROCEDURE — 2580000003 HC RX 258: Performed by: NURSE PRACTITIONER

## 2023-06-03 PROCEDURE — 6370000000 HC RX 637 (ALT 250 FOR IP): Performed by: NURSE PRACTITIONER

## 2023-06-03 PROCEDURE — 6360000002 HC RX W HCPCS: Performed by: NURSE PRACTITIONER

## 2023-06-03 PROCEDURE — 96366 THER/PROPH/DIAG IV INF ADDON: CPT

## 2023-06-03 PROCEDURE — G0378 HOSPITAL OBSERVATION PER HR: HCPCS

## 2023-06-03 RX ORDER — ROSUVASTATIN CALCIUM 40 MG/1
40 TABLET, COATED ORAL NIGHTLY
Qty: 30 TABLET | Refills: 3 | Status: SHIPPED | OUTPATIENT
Start: 2023-06-03

## 2023-06-03 RX ORDER — SULFAMETHOXAZOLE AND TRIMETHOPRIM 800; 160 MG/1; MG/1
1 TABLET ORAL 2 TIMES DAILY
Qty: 8 TABLET | Refills: 0 | Status: SHIPPED | OUTPATIENT
Start: 2023-06-03 | End: 2023-06-07

## 2023-06-03 RX ORDER — ASPIRIN 81 MG/1
81 TABLET ORAL DAILY
Qty: 30 TABLET | Refills: 3 | Status: SHIPPED | OUTPATIENT
Start: 2023-06-04

## 2023-06-03 RX ADMIN — METOPROLOL TARTRATE 25 MG: 25 TABLET, FILM COATED ORAL at 09:02

## 2023-06-03 RX ADMIN — CEFTRIAXONE SODIUM 1000 MG: 1 INJECTION, POWDER, FOR SOLUTION INTRAMUSCULAR; INTRAVENOUS at 13:53

## 2023-06-03 RX ADMIN — ASPIRIN 81 MG: 81 TABLET, COATED ORAL at 09:02

## 2023-06-03 RX ADMIN — LISINOPRIL 10 MG: 10 TABLET ORAL at 09:02

## 2023-06-03 RX ADMIN — APIXABAN 2.5 MG: 2.5 TABLET, FILM COATED ORAL at 09:02

## 2023-06-03 NOTE — DISCHARGE SUMMARY
V2.0  Discharge Summary    Name:  Sosa Michaels /Age/Sex: 1928 (89 y.o. female)   Admit Date: 2023  Discharge Date: 6/3/23    MRN & CSN:  [id number] & [id number] Encounter Date and Time 6/3/23 12:44 PM EDT    Attending:  Cesar Bautista MD Discharging Provider: Cesar Bautista MD       Hospital Course:     Brief HPI:    80 y.o. female, with past medical history of TIA, hypertension, CAD, hyperlipidemia, renal insufficiency and atrial fibrillation, who presented to [facility] with left eye vision changes. The patient stated she has had intermittent vision changes in the left eye for 1 week. She stated she woke up around 7 AM with complete vision loss. She stated she was seeing black dots and then her vision went blurry. The patient stated around 11 AM her vision returned but was not at its baseline. She stated she called her doctor at [facility] and they recommended her to go to the emergency department to rule out a stroke. In the emergency department a CT head was obtained that revealed no acute intracranial abnormality. CTA head and neck was not done due to patient's kidney function. MRI of the head was ordered that was negative for any acute CVA events. Carotid ultrasound was also negative showed less than 50% stenosis in bilateral ICA. There is antegrade flow in bilateral vertebral arteries. Discussed care with neurology in detail who recommended outpatient echocardiogram to be done and the patient is deemed to be stable for discharge with recommendation to follow-up with PCP and neurology in the outpatient basis. Patient is asymptomatic at the time of discharge. Recommendation is to continue taking aspirin and Eliquis along with Crestor. Follow-up with neurology on a regular basis. It was also noticed that patient had a UA that was significantly positive for UTI with some dysuria symptoms at reevaluation. Patient was started on Rocephin at that time.   Urine cultures

## 2023-06-03 NOTE — PLAN OF CARE
Problem: Discharge Planning  Goal: Discharge to home or other facility with appropriate resources  Outcome: Adequate for Discharge     Problem: Pain  Goal: Verbalizes/displays adequate comfort level or baseline comfort level  Outcome: Adequate for Discharge     Problem: Safety - Adult  Goal: Free from fall injury  Outcome: Adequate for Discharge     Problem: ABCDS Injury Assessment  Goal: Absence of physical injury  Outcome: Adequate for Discharge : Yes

## 2023-06-03 NOTE — DISCHARGE INSTR - DIET

## 2023-06-04 LAB
BACTERIA UR CULT: ABNORMAL
BACTERIA UR CULT: ABNORMAL
ORGANISM: ABNORMAL

## 2024-12-19 ENCOUNTER — APPOINTMENT (OUTPATIENT)
Dept: CT IMAGING | Age: 88
End: 2024-12-19
Payer: MEDICARE

## 2024-12-19 ENCOUNTER — APPOINTMENT (OUTPATIENT)
Dept: GENERAL RADIOLOGY | Age: 88
End: 2024-12-19
Payer: MEDICARE

## 2024-12-19 ENCOUNTER — HOSPITAL ENCOUNTER (INPATIENT)
Age: 88
LOS: 1 days | Discharge: NURSING, REHAB OR LONG-TERM CARE | End: 2024-12-20
Attending: EMERGENCY MEDICINE | Admitting: INTERNAL MEDICINE
Payer: MEDICARE

## 2024-12-19 DIAGNOSIS — S32.592A FRACTURE OF MULTIPLE PUBIC RAMI, LEFT, CLOSED, INITIAL ENCOUNTER (HCC): Primary | ICD-10-CM

## 2024-12-19 DIAGNOSIS — I10 ESSENTIAL HYPERTENSION: ICD-10-CM

## 2024-12-19 DIAGNOSIS — S32.10XA CLOSED FRACTURE OF SACRUM, UNSPECIFIED PORTION OF SACRUM, INITIAL ENCOUNTER (HCC): ICD-10-CM

## 2024-12-19 DIAGNOSIS — S72.002S HIP FRACTURE REQUIRING OPERATIVE REPAIR, LEFT, SEQUELA: ICD-10-CM

## 2024-12-19 LAB
ANION GAP SERPL CALCULATED.3IONS-SCNC: 10 MMOL/L (ref 3–16)
APTT BLD: 28.4 SEC (ref 22.1–36.4)
BASOPHILS # BLD: 0 K/UL (ref 0–0.2)
BASOPHILS NFR BLD: 0.6 %
BUN SERPL-MCNC: 37 MG/DL (ref 7–20)
CALCIUM SERPL-MCNC: 8.6 MG/DL (ref 8.3–10.6)
CHLORIDE SERPL-SCNC: 106 MMOL/L (ref 99–110)
CO2 SERPL-SCNC: 24 MMOL/L (ref 21–32)
CREAT SERPL-MCNC: 1.1 MG/DL (ref 0.6–1.2)
DEPRECATED RDW RBC AUTO: 14.5 % (ref 12.4–15.4)
EOSINOPHIL # BLD: 0.1 K/UL (ref 0–0.6)
EOSINOPHIL NFR BLD: 1.3 %
GFR SERPLBLD CREATININE-BSD FMLA CKD-EPI: 46 ML/MIN/{1.73_M2}
GLUCOSE SERPL-MCNC: 98 MG/DL (ref 70–99)
HCT VFR BLD AUTO: 36.6 % (ref 36–48)
HGB BLD-MCNC: 12.3 G/DL (ref 12–16)
INR PPP: 1.06 (ref 0.85–1.15)
LYMPHOCYTES # BLD: 1.3 K/UL (ref 1–5.1)
LYMPHOCYTES NFR BLD: 23.7 %
MCH RBC QN AUTO: 31 PG (ref 26–34)
MCHC RBC AUTO-ENTMCNC: 33.6 G/DL (ref 31–36)
MCV RBC AUTO: 92.3 FL (ref 80–100)
MONOCYTES # BLD: 0.5 K/UL (ref 0–1.3)
MONOCYTES NFR BLD: 8.5 %
NEUTROPHILS # BLD: 3.6 K/UL (ref 1.7–7.7)
NEUTROPHILS NFR BLD: 65.9 %
PLATELET # BLD AUTO: 180 K/UL (ref 135–450)
PMV BLD AUTO: 6.7 FL (ref 5–10.5)
POTASSIUM SERPL-SCNC: 4.2 MMOL/L (ref 3.5–5.1)
PROTHROMBIN TIME: 14 SEC (ref 11.9–14.9)
RBC # BLD AUTO: 3.97 M/UL (ref 4–5.2)
SODIUM SERPL-SCNC: 140 MMOL/L (ref 136–145)
WBC # BLD AUTO: 5.5 K/UL (ref 4–11)

## 2024-12-19 PROCEDURE — 1200000000 HC SEMI PRIVATE

## 2024-12-19 PROCEDURE — 70450 CT HEAD/BRAIN W/O DYE: CPT

## 2024-12-19 PROCEDURE — 80048 BASIC METABOLIC PNL TOTAL CA: CPT

## 2024-12-19 PROCEDURE — 74177 CT ABD & PELVIS W/CONTRAST: CPT

## 2024-12-19 PROCEDURE — 73502 X-RAY EXAM HIP UNI 2-3 VIEWS: CPT

## 2024-12-19 PROCEDURE — 96375 TX/PRO/DX INJ NEW DRUG ADDON: CPT

## 2024-12-19 PROCEDURE — 71045 X-RAY EXAM CHEST 1 VIEW: CPT

## 2024-12-19 PROCEDURE — 96374 THER/PROPH/DIAG INJ IV PUSH: CPT

## 2024-12-19 PROCEDURE — 73700 CT LOWER EXTREMITY W/O DYE: CPT

## 2024-12-19 PROCEDURE — 85610 PROTHROMBIN TIME: CPT

## 2024-12-19 PROCEDURE — 6360000002 HC RX W HCPCS: Performed by: EMERGENCY MEDICINE

## 2024-12-19 PROCEDURE — 85025 COMPLETE CBC W/AUTO DIFF WBC: CPT

## 2024-12-19 PROCEDURE — 96376 TX/PRO/DX INJ SAME DRUG ADON: CPT

## 2024-12-19 PROCEDURE — 99285 EMERGENCY DEPT VISIT HI MDM: CPT

## 2024-12-19 PROCEDURE — 6360000004 HC RX CONTRAST MEDICATION: Performed by: INTERNAL MEDICINE

## 2024-12-19 PROCEDURE — 85730 THROMBOPLASTIN TIME PARTIAL: CPT

## 2024-12-19 RX ORDER — ONDANSETRON 2 MG/ML
4 INJECTION INTRAMUSCULAR; INTRAVENOUS ONCE
Status: COMPLETED | OUTPATIENT
Start: 2024-12-19 | End: 2024-12-19

## 2024-12-19 RX ORDER — MORPHINE SULFATE 2 MG/ML
2 INJECTION, SOLUTION INTRAMUSCULAR; INTRAVENOUS ONCE
Status: COMPLETED | OUTPATIENT
Start: 2024-12-19 | End: 2024-12-19

## 2024-12-19 RX ORDER — IOPAMIDOL 755 MG/ML
75 INJECTION, SOLUTION INTRAVASCULAR
Status: COMPLETED | OUTPATIENT
Start: 2024-12-19 | End: 2024-12-19

## 2024-12-19 RX ORDER — MORPHINE SULFATE 2 MG/ML
2 INJECTION, SOLUTION INTRAMUSCULAR; INTRAVENOUS EVERY 4 HOURS PRN
Status: DISCONTINUED | OUTPATIENT
Start: 2024-12-19 | End: 2024-12-20 | Stop reason: HOSPADM

## 2024-12-19 RX ADMIN — IOPAMIDOL 75 ML: 755 INJECTION, SOLUTION INTRAVENOUS at 22:33

## 2024-12-19 RX ADMIN — MORPHINE SULFATE 2 MG: 2 INJECTION, SOLUTION INTRAMUSCULAR; INTRAVENOUS at 19:56

## 2024-12-19 RX ADMIN — ONDANSETRON 4 MG: 2 INJECTION INTRAMUSCULAR; INTRAVENOUS at 19:55

## 2024-12-19 RX ADMIN — MORPHINE SULFATE 2 MG: 2 INJECTION, SOLUTION INTRAMUSCULAR; INTRAVENOUS at 22:10

## 2024-12-19 ASSESSMENT — PAIN DESCRIPTION - LOCATION
LOCATION: HIP
LOCATION: HIP

## 2024-12-19 ASSESSMENT — PAIN SCALES - GENERAL
PAINLEVEL_OUTOF10: 10
PAINLEVEL_OUTOF10: 8
PAINLEVEL_OUTOF10: 10

## 2024-12-19 ASSESSMENT — LIFESTYLE VARIABLES
HOW OFTEN DO YOU HAVE A DRINK CONTAINING ALCOHOL: NEVER
HOW MANY STANDARD DRINKS CONTAINING ALCOHOL DO YOU HAVE ON A TYPICAL DAY: PATIENT DOES NOT DRINK

## 2024-12-19 ASSESSMENT — PAIN DESCRIPTION - ORIENTATION
ORIENTATION: LEFT

## 2024-12-19 ASSESSMENT — PAIN DESCRIPTION - DESCRIPTORS: DESCRIPTORS: DISCOMFORT

## 2024-12-19 ASSESSMENT — PAIN DESCRIPTION - FREQUENCY: FREQUENCY: CONTINUOUS

## 2024-12-19 ASSESSMENT — PAIN DESCRIPTION - ONSET: ONSET: SUDDEN

## 2024-12-19 ASSESSMENT — PAIN DESCRIPTION - PAIN TYPE: TYPE: ACUTE PAIN

## 2024-12-19 ASSESSMENT — PAIN - FUNCTIONAL ASSESSMENT
PAIN_FUNCTIONAL_ASSESSMENT: 0-10
PAIN_FUNCTIONAL_ASSESSMENT: PREVENTS OR INTERFERES SOME ACTIVE ACTIVITIES AND ADLS

## 2024-12-20 VITALS
WEIGHT: 127.5 LBS | HEIGHT: 66 IN | OXYGEN SATURATION: 96 % | SYSTOLIC BLOOD PRESSURE: 122 MMHG | RESPIRATION RATE: 16 BRPM | DIASTOLIC BLOOD PRESSURE: 39 MMHG | BODY MASS INDEX: 20.49 KG/M2 | HEART RATE: 83 BPM | TEMPERATURE: 97.5 F

## 2024-12-20 PROBLEM — S32.592A FRACTURE OF MULTIPLE PUBIC RAMI, LEFT, CLOSED, INITIAL ENCOUNTER (HCC): Status: ACTIVE | Noted: 2024-12-20

## 2024-12-20 LAB
ALBUMIN SERPL-MCNC: 3 G/DL (ref 3.4–5)
ALBUMIN/GLOB SERPL: 1.2 {RATIO} (ref 1.1–2.2)
ALP SERPL-CCNC: 72 U/L (ref 40–129)
ALT SERPL-CCNC: 13 U/L (ref 10–40)
ANION GAP SERPL CALCULATED.3IONS-SCNC: 7 MMOL/L (ref 3–16)
AST SERPL-CCNC: 20 U/L (ref 15–37)
BASOPHILS # BLD: 0 K/UL (ref 0–0.2)
BASOPHILS NFR BLD: 0.5 %
BILIRUB SERPL-MCNC: 0.4 MG/DL (ref 0–1)
BUN SERPL-MCNC: 31 MG/DL (ref 7–20)
CALCIUM SERPL-MCNC: 8.1 MG/DL (ref 8.3–10.6)
CHLORIDE SERPL-SCNC: 109 MMOL/L (ref 99–110)
CO2 SERPL-SCNC: 24 MMOL/L (ref 21–32)
CREAT SERPL-MCNC: 1.1 MG/DL (ref 0.6–1.2)
DEPRECATED RDW RBC AUTO: 14.2 % (ref 12.4–15.4)
EOSINOPHIL # BLD: 0 K/UL (ref 0–0.6)
EOSINOPHIL NFR BLD: 0.8 %
GFR SERPLBLD CREATININE-BSD FMLA CKD-EPI: 46 ML/MIN/{1.73_M2}
GLUCOSE SERPL-MCNC: 90 MG/DL (ref 70–99)
HCT VFR BLD AUTO: 32 % (ref 36–48)
HGB BLD-MCNC: 10.7 G/DL (ref 12–16)
LYMPHOCYTES # BLD: 1.3 K/UL (ref 1–5.1)
LYMPHOCYTES NFR BLD: 25.3 %
MCH RBC QN AUTO: 31.2 PG (ref 26–34)
MCHC RBC AUTO-ENTMCNC: 33.6 G/DL (ref 31–36)
MCV RBC AUTO: 93 FL (ref 80–100)
MONOCYTES # BLD: 0.6 K/UL (ref 0–1.3)
MONOCYTES NFR BLD: 11.2 %
NEUTROPHILS # BLD: 3.3 K/UL (ref 1.7–7.7)
NEUTROPHILS NFR BLD: 62.2 %
PLATELET # BLD AUTO: 153 K/UL (ref 135–450)
PMV BLD AUTO: 6.9 FL (ref 5–10.5)
POTASSIUM SERPL-SCNC: 4.3 MMOL/L (ref 3.5–5.1)
PROT SERPL-MCNC: 5.6 G/DL (ref 6.4–8.2)
RBC # BLD AUTO: 3.44 M/UL (ref 4–5.2)
SODIUM SERPL-SCNC: 140 MMOL/L (ref 136–145)
WBC # BLD AUTO: 5.3 K/UL (ref 4–11)

## 2024-12-20 PROCEDURE — 2500000003 HC RX 250 WO HCPCS: Performed by: INTERNAL MEDICINE

## 2024-12-20 PROCEDURE — 97166 OT EVAL MOD COMPLEX 45 MIN: CPT

## 2024-12-20 PROCEDURE — 97530 THERAPEUTIC ACTIVITIES: CPT

## 2024-12-20 PROCEDURE — 6370000000 HC RX 637 (ALT 250 FOR IP): Performed by: NURSE PRACTITIONER

## 2024-12-20 PROCEDURE — 97162 PT EVAL MOD COMPLEX 30 MIN: CPT

## 2024-12-20 PROCEDURE — 6360000002 HC RX W HCPCS: Performed by: INTERNAL MEDICINE

## 2024-12-20 PROCEDURE — 80053 COMPREHEN METABOLIC PANEL: CPT

## 2024-12-20 PROCEDURE — 99239 HOSP IP/OBS DSCHRG MGMT >30: CPT | Performed by: NURSE PRACTITIONER

## 2024-12-20 PROCEDURE — 85025 COMPLETE CBC W/AUTO DIFF WBC: CPT

## 2024-12-20 PROCEDURE — 36415 COLL VENOUS BLD VENIPUNCTURE: CPT

## 2024-12-20 RX ORDER — OXYCODONE HYDROCHLORIDE 5 MG/1
2.5 TABLET ORAL EVERY 6 HOURS PRN
Status: DISCONTINUED | OUTPATIENT
Start: 2024-12-20 | End: 2024-12-20 | Stop reason: HOSPADM

## 2024-12-20 RX ORDER — SODIUM CHLORIDE 0.9 % (FLUSH) 0.9 %
5-40 SYRINGE (ML) INJECTION EVERY 12 HOURS SCHEDULED
Status: DISCONTINUED | OUTPATIENT
Start: 2024-12-20 | End: 2024-12-20 | Stop reason: HOSPADM

## 2024-12-20 RX ORDER — ONDANSETRON 4 MG/1
4 TABLET, ORALLY DISINTEGRATING ORAL EVERY 8 HOURS PRN
Status: DISCONTINUED | OUTPATIENT
Start: 2024-12-20 | End: 2024-12-20 | Stop reason: HOSPADM

## 2024-12-20 RX ORDER — ACETAMINOPHEN 325 MG/1
650 TABLET ORAL EVERY 4 HOURS PRN
Status: DISCONTINUED | OUTPATIENT
Start: 2024-12-20 | End: 2024-12-20 | Stop reason: HOSPADM

## 2024-12-20 RX ORDER — ENOXAPARIN SODIUM 100 MG/ML
30 INJECTION SUBCUTANEOUS DAILY
Status: DISCONTINUED | OUTPATIENT
Start: 2024-12-20 | End: 2024-12-20

## 2024-12-20 RX ORDER — ONDANSETRON 2 MG/ML
4 INJECTION INTRAMUSCULAR; INTRAVENOUS EVERY 6 HOURS PRN
Status: DISCONTINUED | OUTPATIENT
Start: 2024-12-20 | End: 2024-12-20 | Stop reason: HOSPADM

## 2024-12-20 RX ORDER — SODIUM CHLORIDE 9 MG/ML
INJECTION, SOLUTION INTRAVENOUS PRN
Status: DISCONTINUED | OUTPATIENT
Start: 2024-12-20 | End: 2024-12-20 | Stop reason: HOSPADM

## 2024-12-20 RX ORDER — ACETAMINOPHEN 650 MG/1
650 SUPPOSITORY RECTAL EVERY 6 HOURS PRN
Status: DISCONTINUED | OUTPATIENT
Start: 2024-12-20 | End: 2024-12-20 | Stop reason: SDUPTHER

## 2024-12-20 RX ORDER — LISINOPRIL 10 MG/1
10 TABLET ORAL DAILY
Status: DISCONTINUED | OUTPATIENT
Start: 2024-12-20 | End: 2024-12-20 | Stop reason: HOSPADM

## 2024-12-20 RX ORDER — SODIUM CHLORIDE 0.9 % (FLUSH) 0.9 %
5-40 SYRINGE (ML) INJECTION PRN
Status: DISCONTINUED | OUTPATIENT
Start: 2024-12-20 | End: 2024-12-20 | Stop reason: HOSPADM

## 2024-12-20 RX ORDER — METOPROLOL TARTRATE 25 MG/1
25 TABLET, FILM COATED ORAL 2 TIMES DAILY
COMMUNITY
Start: 2024-10-28

## 2024-12-20 RX ORDER — METOPROLOL TARTRATE 25 MG/1
25 TABLET, FILM COATED ORAL 2 TIMES DAILY
Status: DISCONTINUED | OUTPATIENT
Start: 2024-12-20 | End: 2024-12-20 | Stop reason: HOSPADM

## 2024-12-20 RX ORDER — ACETAMINOPHEN 325 MG/1
650 TABLET ORAL EVERY 6 HOURS PRN
Status: DISCONTINUED | OUTPATIENT
Start: 2024-12-20 | End: 2024-12-20 | Stop reason: SDUPTHER

## 2024-12-20 RX ORDER — POLYETHYLENE GLYCOL 3350 17 G/17G
17 POWDER, FOR SOLUTION ORAL DAILY
Qty: 1 EACH | Refills: 0 | Status: SHIPPED | OUTPATIENT
Start: 2024-12-20 | End: 2025-01-19

## 2024-12-20 RX ORDER — POLYETHYLENE GLYCOL 3350 17 G/17G
17 POWDER, FOR SOLUTION ORAL DAILY PRN
Status: DISCONTINUED | OUTPATIENT
Start: 2024-12-20 | End: 2024-12-20 | Stop reason: HOSPADM

## 2024-12-20 RX ORDER — METOPROLOL SUCCINATE 25 MG/1
25 TABLET, EXTENDED RELEASE ORAL
Status: DISCONTINUED | OUTPATIENT
Start: 2024-12-20 | End: 2024-12-20 | Stop reason: SDUPTHER

## 2024-12-20 RX ORDER — OXYCODONE HYDROCHLORIDE 5 MG/1
2.5 TABLET ORAL EVERY 6 HOURS PRN
Qty: 8 TABLET | Refills: 0 | Status: SHIPPED | OUTPATIENT
Start: 2024-12-20 | End: 2024-12-23

## 2024-12-20 RX ADMIN — MORPHINE SULFATE 2 MG: 2 INJECTION, SOLUTION INTRAMUSCULAR; INTRAVENOUS at 01:07

## 2024-12-20 RX ADMIN — Medication 10 ML: at 08:54

## 2024-12-20 RX ADMIN — ACETAMINOPHEN 650 MG: 325 TABLET ORAL at 11:38

## 2024-12-20 RX ADMIN — ENOXAPARIN SODIUM 30 MG: 100 INJECTION SUBCUTANEOUS at 08:53

## 2024-12-20 RX ADMIN — Medication 10 ML: at 01:08

## 2024-12-20 ASSESSMENT — PAIN DESCRIPTION - PAIN TYPE
TYPE: ACUTE PAIN
TYPE: ACUTE PAIN

## 2024-12-20 ASSESSMENT — PAIN SCALES - GENERAL
PAINLEVEL_OUTOF10: 8
PAINLEVEL_OUTOF10: 0
PAINLEVEL_OUTOF10: 8
PAINLEVEL_OUTOF10: 8

## 2024-12-20 ASSESSMENT — PAIN - FUNCTIONAL ASSESSMENT: PAIN_FUNCTIONAL_ASSESSMENT: PREVENTS OR INTERFERES WITH ALL ACTIVE AND SOME PASSIVE ACTIVITIES

## 2024-12-20 ASSESSMENT — PAIN DESCRIPTION - LOCATION
LOCATION: HIP

## 2024-12-20 ASSESSMENT — PAIN DESCRIPTION - ORIENTATION
ORIENTATION: LEFT

## 2024-12-20 ASSESSMENT — PAIN DESCRIPTION - ONSET: ONSET: ON-GOING

## 2024-12-20 ASSESSMENT — PAIN DESCRIPTION - DESCRIPTORS: DESCRIPTORS: DISCOMFORT

## 2024-12-20 ASSESSMENT — PAIN DESCRIPTION - FREQUENCY: FREQUENCY: CONTINUOUS

## 2024-12-20 ASSESSMENT — PAIN DESCRIPTION - DIRECTION: RADIATING_TOWARDS: DENIES

## 2024-12-20 NOTE — DISCHARGE INSTR - COC
Continuity of Care Form    Patient Name: Lubna Mobley   :  1928  MRN:  [id number]    Admit date:  2024  Discharge date:  2024    Code Status Order: Full Code   Advance Directives:   Advance Care Flowsheet Documentation             Admitting Physician:  Kilo Pace MD  PCP: Sheldon Ortiz MD    Discharging Nurse: Viktoriya [facility]un[facility]ing [facility] Unit/Room#: 0226/0226-01  Discharging Unit Phone Number: [phone]    Emergency Contact:   Extended Emergency Contact Information  Primary Emergency Contact: David Heath  Address: [address] [place] [address]  Home Phone: [phone]  Mobile Phone: [phone]  Relation: Child  Secondary Emergency Contact: Kaylin Woods   [facility]  Home Phone: [phone]  Relation: Child    Past Surgical History:  Past Surgical History:   Procedure Laterality Date    ACHILLES TENDON SURGERY  2017    R achilles tendon debridement     BACK SURGERY  2003    back surgery x3; lower back    CARPAL TUNNEL RELEASE Right 2002    CORONARY ANGIOPLASTY WITH STENT PLACEMENT  2006    HIP SURGERY Left 2016    hip pinning    HYSTERECTOMY (CERVIX STATUS UNKNOWN)  1965       Immunization History:   Immunization History   Administered Date(s) Administered    COVID-19, MODERNA BLUE border, Primary or Immunocompromised, (age 12y+), IM, 100 mcg/0.5mL 2021, 2021    COVID-19, MODERNA Bivalent, (age 12y+), IM, 50 mcg/0.5 mL 10/27/2022    Influenza Whole 2015    Influenza, FLUAD, (age 65 y+), IM, Trivalent PF, 0.5mL 2019    Influenza, FLUZONE High Dose, (age 65 y+), IM, Trivalent PF, 0.5mL 10/06/2015, 2017    Pneumococcal, PPSV23, PNEUMOVAX 23, (age 2y+), SC/IM, 0.5mL 2015       Active Problems:  Patient Active Problem List   Diagnosis Code    Hypertension I10    Hyperlipidemia E78.5    Atrial fibrillation (HCC) I48.91    Arthritis M19.90    Closed left hip fracture

## 2024-12-20 NOTE — H&P
[facility] Medicine History & Physical      Patient Name: Lubna Mobley    : 1928    PCP: Sheldon Ortiz MD    Date of Service:  Patient seen and examined on 24     Chief Complaint: Fall    History Of Present Illness:    Lubna Mobley is a 89 y.o. female with a PMH of A-fib, CAD, hypertension, hyperlipidemia, who presented to ED with complaint of fall    Patient presents to the ED after mechanical fall at home.  She endorses that she was reaching to get a port slipped and fell.  Fell on her left side with complaints of left hip pain.  No loss of consciousness.  EMS was called brought to the ED for further evaluation.    Blood pressure 171/103 pulse 76 temperature 98.7 respiration 17 saturating 98%  BMP stable glucose 96 WBC 5.5 hemoglobin 12.3.  Coag panel stable  Chest x-ray shows no acute thoracic injury.  CT hip left shows nondisplaced fracture of the left superior and inferior pubic rami nondisplaced fracture of the left sacral alae at S1 status post percutaneous pinning of the left femoral neck trace retroperitoneal edema or trace hematoma along the left pelvis.    In the ED patient received Zofran and morphine.    Past Medical History:    Patient has a past medical history of A-fib (HCC), Achilles tendon injury, Arthritis, Coronary artery disease involving native coronary artery of native heart without angina pectoris, Hyperlipidemia, Hypertension, Kidney insufficiency, MI, old, PONV (postoperative nausea and vomiting), Urinary frequency, and UTI (urinary tract infection).    Past Surgical History:    Patient has a past surgical history that includes Coronary angioplasty with stent (); hip surgery (Left, 2016); Carpal tunnel release (Right, ); back surgery (); Hysterectomy (); and Achilles tendon surgery (2017).    Medications Prior to Admission:      Prior to Admission medications    Medication Sig Start Date End Date Taking? Authorizing Provider    aspirin 81 MG EC tablet Take 1 tablet by mouth daily 6/4/23   Elias Gilmore MD   rosuvastatin (CRESTOR) 40 MG tablet Take 1 tablet by mouth nightly 6/3/23   Elias Gilmore MD   metoprolol succinate (TOPROL XL) 25 MG extended release tablet Take 1 tablet by mouth in the morning and 1 tablet in the evening. 10/16/22   Berny Hopkins MD   Multiple Vitamins-Minerals (THERAPEUTIC MULTIVITAMIN-MINERALS) tablet Take 1 tablet by mouth daily    Matthew Cunningham MD   ELIQUIS 2.5 MG TABS tablet TK 1 T PO BID 8/13/16   Matthew Cunningham MD   acetaminophen 650 MG TABS Take 650 mg by mouth every 4 hours as needed 1/24/16   June Pedersen PA   lisinopril (PRINIVIL;ZESTRIL) 10 MG tablet Take 10 mg by mouth daily    ProviderMatthew MD       Allergies:  Epinephrine and Other    Social History:      TOBACCO:   reports that she has never smoked. She has never used smokeless tobacco.  ETOH:   reports no history of alcohol use.  DRUGS:  reports no history of drug use.    Family History:      Reviewed in detail positive as follows:    History reviewed. No pertinent family history.    REVIEW OF SYSTEMS:   Pertinent positives as noted in the HPI. All other systems reviewed and negative.    PHYSICAL EXAM PERFORMED:    BP (!) 171/103   Pulse 76   Temp 98.7 °F (37.1 °C) (Oral)   Resp 17   Ht 1.676 m (5' 6\")   Wt 59.9 kg (132 lb)   SpO2 98%   BMI 21.31 kg/m²     General appearance:  Awake, alert, in mild physical distress, cachectic  HEENT:  Normocephalic, atraumatic   Neck: Supple,   Respiratory:  Clear to auscultation bilaterally   Cardiovascular:  Irregular rate and rhythm   Abdomen: Soft, NT, ND, without rebound or guarding. Normal bowel sounds.  Extremities:  No clubbing, cyanosis, or edema bilaterally.  Left hip pain  Skin:  Warm/dry.  Neurologic:  . Alert and oriented x 3. Normal speech.  Psychiatric:  Thought content appropriate, normal insight.    Labs:   CBC   Recent Labs     12/19/24 1959   WBC 5.5   HGB

## 2024-12-20 NOTE — FLOWSHEET NOTE
12/20/24 1421   Vital Signs   BP Location Right upper arm   BP Method Automatic   Patient Position Supine   Orthostatic B/P and Pulse? Yes   Blood Pressure Lying 112/43   Pulse Lying 66 PER MINUTE   Blood Pressure Sitting 111/45   Pulse Sitting 71 PER MINUTE   Blood Pressure Standing 108/62   Pulse Standing 92 PER MINUTE

## 2024-12-20 NOTE — FLOWSHEET NOTE
12/20/24 1314   Vital Signs   BP (!) 99/32   MAP (Calculated) 54   BP Location Right upper arm   BP Method Manual       BP low. NP notified via perfect serve. Spoke with her on the phone, suspecting orthostatic BPs.. Patient had just worked with PT and OT. Therapy said that patient c/o vision loss while standing earlier during therapy. Writer got patient back into bed w/ samira steady.

## 2024-12-20 NOTE — CARE COORDINATION
Case Management Assessment  Initial Evaluation    Date/Time of Evaluation: 12/20/2024 8:27 AM  Assessment Completed by: Helene Chua RN    If patient is discharged prior to next notation, then this note serves as note for discharge by case management.    Patient Name: Lubna Mobley                   YOB: 1928  Diagnosis: Essential hypertension [I10]  Hip fracture requiring operative repair, left, sequela [S72.002S]  Fracture of multiple pubic rami, left, closed, initial encounter ([facility]) [S32.592A]  Closed fracture of sacrum, unspecified portion of sacrum, initial encounter ([facility]) [S32.10XA]                   Date / Time: 12/19/2024  7:27 PM    Patient Admission Status: Inpatient   Readmission Risk (Low < 19, Mod (19-27), High > 27): Readmission Risk Score: 13.6    Current PCP: Sheldon Ortiz MD  PCP verified by CM? Yes    Chart Reviewed: Yes      History Provided by: Patient  Patient Orientation: Alert and Oriented    Patient Cognition: Alert    Hospitalization in the last 30 days (Readmission):  No    If yes, Readmission Assessment in  Navigator will be completed.    Advance Directives:      Code Status: Full Code   Patient's Primary Decision Maker is: Legal Next of Kin    Primary Decision Maker: Chuck,Kaylin [facility] - [phone]    Discharge Planning:    Patient lives with: Children Type of Home: House  Primary Care Giver: Self  Patient Support Systems include: Children   Current Financial resources: Medicare  Current community resources: None  Current services prior to admission: None            Current DME:              Type of Home Care services:  None    ADLS  Prior functional level: Independent in ADLs/IADLs  Current functional level: Independent in ADLs/IADLs    PT AM-PAC:   /24  OT AM-PAC:   /24    Family can provide assistance at DC: Yes  Would you like Case Management to discuss the discharge plan with any other family members/significant others, and if so, who? No  Plans  CIARA Chua  Case Management Department  Ph: [phone] Fax: [phone]

## 2024-12-20 NOTE — PLAN OF CARE
Problem: Discharge Planning  Goal: Discharge to home or other facility with appropriate resources  12/20/2024 1727 by Jessenia Alcazar RN  Outcome: Adequate for Discharge  12/20/2024 0959 by Jessenia Alcazar RN  Outcome: Progressing  Flowsheets (Taken 12/20/2024 0851)  Discharge to home or other facility with appropriate resources: Identify barriers to discharge with patient and caregiver     Problem: Safety - Adult  Goal: Free from fall injury  12/20/2024 1727 by Jessenia Alcazar RN  Outcome: Adequate for Discharge  12/20/2024 0959 by Jessenia Alcazar RN  Outcome: Progressing     Problem: Pain  Goal: Verbalizes/displays adequate comfort level or baseline comfort level  12/20/2024 1727 by Jessenia Alcazar RN  Outcome: Adequate for Discharge  Flowsheets (Taken 12/20/2024 1304)  Verbalizes/displays adequate comfort level or baseline comfort level:   Encourage patient to monitor pain and request assistance   Assess pain using appropriate pain scale  12/20/2024 0959 by Jessenia Alcazar RN  Outcome: Progressing  Flowsheets (Taken 12/20/2024 0851)  Verbalizes/displays adequate comfort level or baseline comfort level:   Encourage patient to monitor pain and request assistance   Assess pain using appropriate pain scale

## 2024-12-20 NOTE — PLAN OF CARE
Ortho Plan of Care    Received a call regarding patient's L hip. Patient fell and sustained a left superior and inferior pubic rami fracture and a left S1 fracture    Plan  - CT imaging demonstrates stable injury, no surgery planned  - 50% weight bearing left leg  - PT eval and pain control  - Full consult to follow    Chase Ledezma DO

## 2024-12-20 NOTE — PROGRESS NOTES
Inpatient Physical Therapy Evaluation & Treatment    Unit: [facility]  Date:  12/20/2024  Patient Name:    Lubna Mobley  Admitting diagnosis:  Essential hypertension [I10]  Hip fracture requiring operative repair, left, sequela [S72.002S]  Fracture of multiple pubic rami, left, closed, initial encounter ([facility]) [S32.592A]  Closed fracture of sacrum, unspecified portion of sacrum, initial encounter ([facility]) [S32.10XA]  Admit Date:  12/19/2024  Precautions/Restrictions/WB Status/ Lines/ Wounds/ Oxygen: Fall risk, Bed/chair alarm, and Lines (external catheter)      Pt seen for cotreatment this date due to patient safety, patient endurance, and acute illness/injury    Treatment Time:  11:31-12:20  Treatment Number:  1   Timed Code Treatment Minutes: 39 minutes  Total Treatment Minutes:  49  minutes    Patient Stated Goals for Therapy: not stated, agreeable to evaluation. Thinks she is going to [facility] for rehab.         Discharge Recommendations: SNF  DME needs for discharge: Defer to facility       Therapy recommendation for EMS Transport: requires transport by cot due to pt needs lift equipment for safe transfers    Therapy recommendations for staff:   Assist of 1 for transfers with use of ELIZABETH STEDY and gait belt to/from BSC  to/from chair    History of Present Illness:   Mechanical fall at home while reaching for a pot. Fell on her left side with complaints of left hip pain. No loss of consciousness. Noted to have fractures to left superior and inferior pubic rami and to the sacral ala at S1.    Ortho consult completed by CHRISTIANE Fischer on 12/20:   Plan:  1) plan for non operative management of the fractures and will continue to monitor with follow up with Dr. Ledezma in 2 weeks. For repeat xrays.   2) patient can pursue PT/OT eval and treatment with 50%WB on the left lower extremity with use of walker.   3) recommend that patient pursue SNF for continued eval and treatment for the LLE. With the plan for transition to home

## 2024-12-20 NOTE — PLAN OF CARE
Problem: Discharge Planning  Goal: Discharge to home or other facility with appropriate resources  12/20/2024 0959 by Jessenia Alcazar RN  Outcome: Progressing  Flowsheets (Taken 12/20/2024 0851)  Discharge to home or other facility with appropriate resources: Identify barriers to discharge with patient and caregiver  12/20/2024 0158 by Sophia Fuentes RN  Outcome: Progressing     Problem: Safety - Adult  Goal: Free from fall injury  12/20/2024 0959 by Jessenia Alcazar RN  Outcome: Progressing  12/20/2024 0158 by Sophia Fuentes RN  Outcome: Progressing     Problem: Pain  Goal: Verbalizes/displays adequate comfort level or baseline comfort level  12/20/2024 0959 by Jessenia Alcazar RN  Outcome: Progressing  Flowsheets (Taken 12/20/2024 0851)  Verbalizes/displays adequate comfort level or baseline comfort level:   Encourage patient to monitor pain and request assistance   Assess pain using appropriate pain scale  12/20/2024 0158 by Sophia Fuentes RN  Outcome: Progressing

## 2024-12-20 NOTE — PROGRESS NOTES
Pt admitted to 226 from ED. Positioned in bed for comfort, oriented to staff. Call light use reviewed with verbal understanding received and call light in reach. Verbal understanding of NPO status.  Vital signs obtained.  Pain rated at 8 on 1:10 scale to her left hip. Medicated per MD order-see SONA Da Silva,RN Clinical

## 2024-12-20 NOTE — FLOWSHEET NOTE
12/20/24 1343   Vital Signs   Pulse 68   BP (!) 103/41   MAP (Calculated) 62   BP Location Right upper arm   BP Method Automatic   Patient Position Supine     Patient resting comfortably in bed. BP improving. PARRISH Morales made aware of improvement via perfect serve.

## 2024-12-20 NOTE — ED PROVIDER NOTES
[facility] ED  EMERGENCY DEPARTMENT ENCOUNTER      Pt Name: Lubna Mobley  MRN: [id number]  Birthdate 2/1/1928  Date of evaluation: 12/19/2024  Provider: Romana Avendano MD  11:29 PM    CHIEF COMPLAINT       Chief Complaint   Patient presents with    Fall     Pt arrives via squad with c/o having a fall PTA. Pt states she was reaching to get a pot and slipped and fell. Pt with c/o left hip pain. Pt states unsure if she hit head, denies LOC. Pt states on blood thinners, but unsure what it is.          HISTORY OF PRESENT ILLNESS      Lubna Mobley is a 89 y.o. female who presents to the emergency department with left-sided hip pain after mechanical trip and fall.  Does not think she struck her head.  Denies any other injury.. No other complaints.    The history is provided by the patient and a relative.       Unless otherwise stated in HPI, history was obtained from the patient.     Nursing Notes were reviewed.    REVIEW OF SYSTEMS       ROS negative unless otherwise specified in HPI.      PAST MEDICAL HISTORY     Past Medical History:   Diagnosis Date    A-fib (HCC)     Achilles tendon injury 8/23/2016    Arthritis     Coronary artery disease involving native coronary artery of native heart without angina pectoris 10/16/2022    Hyperlipidemia     Hypertension     Kidney insufficiency     MI, old     PONV (postoperative nausea and vomiting)     Urinary frequency     UTI (urinary tract infection) 8/19/2018         SURGICAL HISTORY       Past Surgical History:   Procedure Laterality Date    ACHILLES TENDON SURGERY  03/02/2017    R achilles tendon debridement     BACK SURGERY  2003    back surgery x3; lower back    CARPAL TUNNEL RELEASE Right 2002    CORONARY ANGIOPLASTY WITH STENT PLACEMENT  2006    HIP SURGERY Left 1/21/2016    hip pinning    HYSTERECTOMY (CERVIX STATUS UNKNOWN)  1965         CURRENT MEDICATIONS       Previous Medications    ACETAMINOPHEN 650 MG TABS    Take 650 mg by mouth every  Movements: Extraocular movements intact.      Pupils: Pupils are equal, round, and reactive to light.   Cardiovascular:      Rate and Rhythm: Normal rate.      Pulses: Normal pulses.   Pulmonary:      Effort: Pulmonary effort is normal.      Breath sounds: Normal breath sounds.   Chest:      Chest wall: No tenderness.   Abdominal:      Tenderness: There is no abdominal tenderness. There is no right CVA tenderness, left CVA tenderness, guarding or rebound.   Musculoskeletal:      Cervical back: No rigidity or tenderness.      Comments: No tenderness along the spine      Internal and external rotation of the left hip elicits pain, 2+ distal pulses   Skin:     General: Skin is dry.   Neurological:      General: No focal deficit present.      Mental Status: She is alert and oriented to person, place, and time.      Motor: No weakness.      Coordination: Coordination normal.   Psychiatric:         Mood and Affect: Mood normal.         DIAGNOSTIC RESULTS     EKG: All EKG's are interpreted by the Emergency Department Physician who either signs or Co-signs this chart in the absence of a cardiologist.        RADIOLOGY:   Non-plain film images such as CT, Ultrasound and MRI are read by the radiologist. Plain radiographic images are visualized and preliminarily interpreted by the emergency physician with the below findings:        Interpretation per the Radiologist below, if available at the time of this note:    CT HIP LEFT WO CONTRAST   Final Result   1. Nondisplaced fractures of the left superior and inferior pubic rami.   2. Nondisplaced fracture of the left sacral ala at S1.   3. Status post percutaneous pinning of the left femoral neck. Hardware   appears intact.   4. Trace retroperitoneal edema or traces of hematoma along the left pelvic   sidewall presumably related to the acute fractures.  Intra-abdominal process   cannot entirely be excluded.         CT Head W/O Contrast   Final Result   No acute intracranial

## 2024-12-20 NOTE — CONSULTS
Department of Orthopedic Surgery  Physician Assistant   Consult Note        Reason for Consult:  pelvic and sacral fractures left side.   Requesting Physician: Frieda Leon DO   Date of Service: 12/20/2024 11:59 AM    CHIEF COMPLAINT:  As Above    History Obtained From:  patient, electronic medical record    HISTORY OF PRESENT ILLNESS:                The patient is a 89 y.o. female who presents with above chief complaint.  Patient presents to the ED after mechanical fall at home. She endorses that she was reaching to get a pot slipped and fell. Fell on her left side with complaints of left hip pain. No loss of consciousness. Noted to have fractures to left superior and inferior pubic rami and to the sacral ala at S1. Patient is resting comfortably in bed with daughter at bedside she notes no pain at present time. She notes that she lives with her son but he works 5 days a week so she is often alone in the home. She does not normally use any assistive walking devices. Notes that the hip was repaired her at [facility] over 7 years ago and has had no issues with it since.       Past Medical History:        Diagnosis Date    A-fib ([facility])     Achilles tendon injury 8/23/2016    Arthritis     Coronary artery disease involving native coronary artery of native heart without angina pectoris 10/16/2022    Hyperlipidemia     Hypertension     Kidney insufficiency     MI, old     PONV (postoperative nausea and vomiting)     Urinary frequency     UTI (urinary tract infection) 8/19/2018     Past Surgical History:        Procedure Laterality Date    ACHILLES TENDON SURGERY  03/02/2017    R achilles tendon debridement     BACK SURGERY  2003    back surgery x3; lower back    CARPAL TUNNEL RELEASE Right 2002    CORONARY ANGIOPLASTY WITH STENT PLACEMENT  2006    HIP SURGERY Left 1/21/2016    hip pinning    HYSTERECTOMY (CERVIX STATUS UNKNOWN)  1965         Medications Prior to Admission:   Prior to Admission medications    Medication Sig

## 2024-12-20 NOTE — PROGRESS NOTES
4 Eyes Skin Assessment     NAME:  Lubna Mobley  YOB: 1928  MEDICAL RECORD NUMBER:  [id number]    The patient is being assessed for  Other discharge to SNF     I agree that at least one RN has performed a thorough Head to Toe Skin Assessment on the patient. ALL assessment sites listed below have been assessed.      Areas assessed by both nurses:    Head, Face, Ears, Shoulders, Back, Chest, Arms, Elbows, Hands, Sacrum. Buttock, Coccyx, Ischium, Legs. Feet and Heels, and Under Medical Devices         Scattered bruising   Blanchable redness on buttocks     Does the Patient have a Wound? No noted wound(s)       Zane Prevention initiated by RN: Yes  Wound Care Orders initiated by RN: No    Pressure Injury (Stage 3,4, Unstageable, DTI, NWPT, and Complex wounds) if present, place Wound referral order by RN under : No    New Ostomies, if present place, Ostomy referral order under : No     Nurse 1 eSignature: Electronically signed by Jessenia Alcazar RN on 12/20/24 at 6:25 PM EST    **SHARE this note so that the co-signing nurse can place an eSignature**    Nurse 2 eSignature: Electronically signed by Solange Germain RN on 12/20/24 at 6:26 PM EST

## 2024-12-20 NOTE — PROGRESS NOTES
Shift assessment complete. VSS. Patient A/OX4. Denies pain. Scheduled meds given. See MAR. Pure wick in place, working well. Pt denies needs at this time. Call light within reach. Bed alarm on.

## 2024-12-20 NOTE — PROGRESS NOTES
Patient admitted to room 226 from ER. Side rails up x2. Patient does not have an order for telemetry. Bed is locked and in lowest position. Call light placed in patient reach. Patient explained the routine of the hospital, including but not limited to lab work, vital signs, hourly rounding, etc. Care plans and education updated, CHG wipes done at time of admission.     BP (!) 186/69   Pulse 76   Temp 99 °F (37.2 °C) (Oral)   Resp 18   Ht 1.676 m (5' 6\")   Wt 57.8 kg (127 lb 8 oz)   SpO2 100%   BMI 20.58 kg/m²     Most recent set of vitals as shown.     Patient has an LDA that does not require CHG wipes, including possible a surgery incision, ulrich catheter, or a central line.     4 Eyes Skin Assessment     The patient is being assess for   Admission    I agree that 2 RN's have performed a thorough Head to Toe Skin Assessment on the patient. ALL assessment sites listed below have been assessed.      Pt has scattered ecchymosis. Pt has no other skin issues.  Areas assessed for pressure by both nurses:   [x]   Head, Face, and Ears   [x]   Shoulders, Back, and Chest, Abdomen  [x]   Arms, Elbows, and Hands   [x]   Coccyx, Sacrum, and Ischium  [x]   Legs, Feet, and Heels        Skin Assessed Under all Medical Devices by both nurses:  NA               All Mepilex Borders were peeled back and area peeked at by both nurses:  No: NA  Please list where Mepilex Borders are located:  NA           Co-signer eSignature: Electronically signed by Sandrine Benedict RN on 12/20/24 at 3:41 AM EST    Does the Patient have Skin Breakdown related to pressure?  No          Zane Prevention initiated:  NA   Wound Care Orders initiated:  NA      [facility] nurse consulted for Pressure Injury (Stage 3,4, Unstageable, DTI, NWPT, Complex wounds)and New or Established Ostomies:  NA      Primary Nurse eSignature: Electronically signed by BRITTANY CALDERON RN on 12/20/24 at 1:58 AM EST      Bedside Mobility Assessment Tool (BMAT):     Assessment Level  1- Sit and Shake    1. From a semi-reclined position, ask patient to sit up and rotate to a seated position at the side of the bed. Can use the bedrail.    2. Ask patient to reach out and grab your hand and shake making sure patient reaches across his/her midline.   Fail- Patient is unable to perform tasks, patient is MOBILITY LEVEL 1.    Assessment Level 2- Stretch and Point   1. With patient in seated position at the side of the bed, have patient place both feet on the floor (or stool) with knees no higher than hips.    2. Ask patient to stretch one leg and straighten the knee, then bend the ankle/flex and point the toes. If appropriate, repeat with the other leg.   Fail- Patient is unable to complete task. Patient is MOBILITY LEVEL 2.     Assessment Level 3- Stand   1. Ask patient to elevate off the bed or chair (seated to standing) using an assistive device (cane, bedrail).    2. Patient should be able to raise buttocks off be and hold for a count of five. May repeat once.   Fail- Patient unable to demonstrate standing stability. Patient is MOBILITY LEVEL 3.     Assessment Level 4- Walk   1. Ask patient to march in place at bedside.    2. Then ask patient to advance step and return each foot. Some medical conditions may render a patient from stepping backwards, use your best clinical judgement.   Fail- Patient not able to complete tasks OR requires use of assistive device. Patient is MOBILITY LEVEL 3.       Mobility Level- 1    Patient is not able to demonstrated the ability to move from a reclining position to an upright position within the recliner. however patient is alert, oriented and able to provide informed consent

## 2024-12-20 NOTE — DISCHARGE SUMMARY
right femur appears intact. Soft Tissue: Postsurgical changes overlying the left greater trochanter. Otherwise, no acute soft tissue abnormality identified.  Trace retroperitoneal edema or traces of hematoma along the left pelvic sidewall. Colonic diverticulosis.  Urinary bladder is unremarkable. Joint: No dislocation.     1. Nondisplaced fractures of the left superior and inferior pubic rami. 2. Nondisplaced fracture of the left sacral ala at S1. 3. Status post percutaneous pinning of the left femoral neck. Hardware appears intact. 4. Trace retroperitoneal edema or traces of hematoma along the left pelvic sidewall presumably related to the acute fractures.  Intra-abdominal process cannot entirely be excluded.     CT Head W/O Contrast    Result Date: 12/19/2024  EXAMINATION: CT OF THE HEAD WITHOUT CONTRAST  12/19/2024 8:28 pm TECHNIQUE: CT of the head was performed without the administration of intravenous contrast. Automated exposure control, iterative reconstruction, and/or weight based adjustment of the mA/kV was utilized to reduce the radiation dose to as low as reasonably achievable. COMPARISON: 06/01/2023. HISTORY: ORDERING SYSTEM PROVIDED HISTORY: fall TECHNOLOGIST PROVIDED HISTORY: Reason for exam:->fall Has a \"code stroke\" or \"stroke alert\" been called?->No Decision Support Exception - unselect if not a suspected or confirmed emergency medical condition->Emergency Medical Condition (MA) Reason for Exam: fall FINDINGS: BRAIN/VENTRICLES: There is no acute intracranial hemorrhage, mass effect or midline shift.  No abnormal extra-axial fluid collection.  The gray-white differentiation is maintained without evidence of an acute infarct.  There is no evidence of hydrocephalus. Stable generalized prominence of the ventricular and cisternal spaces.  Prominent decreased attenuation in the periventricular and subcortical white matter most consistent with small vessel ischemic change.  Intracranial atherosclerotic  8.1*     No results for input(s): \"PROBNP\", \"TROPHS\" in the last 72 hours.  No results for input(s): \"LABA1C\" in the last 72 hours.  Recent Labs     12/20/24  0504   AST 20   ALT 13   BILITOT 0.4   ALKPHOS 72     Recent Labs     12/19/24  1959   INR 1.06       Urine Cultures:   Lab Results   Component Value Date/Time    LABURIN  06/02/2023 10:56 AM     <50,000 CFU/ml mixed skin/urogenital brittany. No further workup    LABURIN >100,000 CFU/ml 06/02/2023 10:56 AM     Blood Cultures:   Lab Results   Component Value Date/Time    BC See additional report for complete BCID panel. 08/19/2018 03:26 PM    BC  08/19/2018 03:26 PM     POSITIVE for  Isolated one out of two bottles  No further workup  This organism was isolated from one bottle only.  Susceptibility testing is not routinely done as this  organism frequently represents skin contamination.  Additional testing can be ordered by calling the  Microbiology Department within 30 days.       Lab Results   Component Value Date/Time    BLOODCULT2 No growth after 5 days of incubation. 08/19/2018 05:20 PM     Organism:   Lab Results   Component Value Date/Time    ORG Klebsiella pneumoniae 06/02/2023 10:56 AM       Signed:    MICHELLE Pang CNP

## 2024-12-20 NOTE — DISCHARGE INSTRUCTIONS
Continue with PT/OT eval with 50% WBAT on the LLE   Follow up with Dr. Ledezma in 2 weeks for repeat xrays.

## 2024-12-20 NOTE — PROGRESS NOTES
[facility] Medicine Progress Note  V 10.25      Date of Admission: 12/19/2024    Hospital Day: 2      Chief Admission Complaint:  Fall     Subjective: EMR notes reviewed patient seen and examined sitting up awake alert in bed with daughter at bedside.  Her pain is controlled at rest but she has not ambulated yet.  No fevers chills nausea vomiting diarrhea she is voiding without difficulty and taking p.o.    Presenting Admission History:     Lubna Mobley is a 89 y.o. female with a PMH of A-fib, CAD, hypertension, hyperlipidemia, who presented to ED with complaint of fall     Patient presents to the ED after mechanical fall at home.  She endorses that she was reaching to get a port slipped and fell.  Fell on her left side with complaints of left hip pain.  No loss of consciousness.  EMS was called brought to the ED for further evaluation.     Blood pressure 171/103 pulse 76 temperature 98.7 respiration 17 saturating 98%  BMP stable glucose 96 WBC 5.5 hemoglobin 12.3.  Coag panel stable  Chest x-ray shows no acute thoracic injury.  CT hip left shows nondisplaced fracture of the left superior and inferior pubic rami nondisplaced fracture of the left sacral alae at S1 status post percutaneous pinning of the left femoral neck trace retroperitoneal edema or trace hematoma along the left pelvis.      Assessment/Plan:      Current Principal Problem:  Hip fracture requiring operative repair, left, sequela  #Mechanical fall-   - cont safety precautions  - head CT negative, c spine  cleared     #Left hip fracture  Patient presents to the ED with complaints of mechanical fall at home  Brought to the ED by EMS due to left hip pain  CT results   IMPRESSION:  Trace edema or hemorrhage along the left pelvic sidewall has not appreciably  changed. No evidence of active hemorrhage.     Hematoma lateral to the left hip measuring approximately 2.5 x 1.5 cm appears  unchanged in size.  Focal hyperdensity within the hematoma

## 2024-12-20 NOTE — CARE COORDINATION
DISCHARGE ORDER  Date/Time 2024 3:34 PM  Completed by: Helene Chua RN, Case Management    Patient Name: Lubna Mobley    : 1928      Admit order Date and Status:24 IP  Noted discharge order. (verify MD's last order for status of admission/Traditional Medicare 3 [place] Inpatient qualifying stay required for SNF)    Confirmed discharge plan with:              Patient:  Yes              When pt confirms DC plan does any support person need to be contacted by CM Yes  daughters at bedside               Discharge to Facility: [facility]   Facility phone number for staff giving report: [phone]   Pre-certification completed: yes. Able to dc today per [facility] Exemption Notification (HENS) completed: facility to complete PASSR due to issues with HENS system   Discharge orders and Continuity of Care faxed to facility:  [facility]      Transportation:               Medical Transport explained with choice list offered to pt/family.                Choice:(no preference)  Agency used: [facility]   time:   1800      Pt/family/Nursing/Facility aware of  time: 1800  Yes Names: Helene Calvo, Kaylin Guardado  Ambulance form completed:  yes:      Date Last IMM Given: 24    Comments:    Pt is being d/c'd to [facility] today. Pt's O2 sats are 95% on RA.    Discharge timeout done with Pt, RN, CM. All discharge needs and concerns addressed.    Discharging nurse to complete JAY, reconcile AVS, and place final copy with patient's discharge packet. Discharging RN to ensure that written prescriptions for  Level II medications are sent with patient to the facility as per protocol.

## 2024-12-20 NOTE — PROGRESS NOTES
Inpatient Occupational Therapy Evaluation & Treatment    Unit: [address] [facility]  Date:  12/20/2024  Patient Name:    Lubna Mobley  Admitting diagnosis:  Essential hypertension [I10]  Hip fracture requiring operative repair, left, sequela [S72.002S]  Fracture of multiple pubic rami, left, closed, initial encounter ([facility]) [S32.592A]  Closed fracture of sacrum, unspecified portion of sacrum, initial encounter ([facility]) [S32.10XA]  Admit Date:  12/19/2024  Precautions/Restrictions/WB Status/ Lines/ Wounds/ Oxygen: Fall risk, Lines (IV and external catheter), and WB Restrictions (50% WB LLE)    Pt seen for cotreatment this date due to patient safety, patient endurance, and complexity of condition    Treatment Time:  11:31-12:20  Treatment Number:  1  Timed Code Treatment Minutes: 39 minutes  Total Treatment Minutes: 49 minutes    Patient Goals for Therapy: \"to go to rehab\"          Discharge Recommendations: SNF  DME needs for discharge: Defer to facility       Therapy recommendations for staff:   Assist of 1 for transfers with use of ELIZABETH STEDY and gait belt to/from BSC  to/from chair    History of Present Illness: Per admission H&P from Dr. Pace on 12/19  \"Lubna Mobley is a 89 y.o. female with a PMH of A-fib, CAD, hypertension, hyperlipidemia, who presented to ED with complaint of fall     Patient presents to the ED after mechanical fall at home.  She endorses that she was reaching to get a port slipped and fell.  Fell on her left side with complaints of left hip pain.  No loss of consciousness.  EMS was called brought to the ED for further evaluation.     Blood pressure 171/103 pulse 76 temperature 98.7 respiration 17 saturating 98%  BMP stable glucose 96 WBC 5.5 hemoglobin 12.3.  Coag panel stable  Chest x-ray shows no acute thoracic injury.  CT hip left shows nondisplaced fracture of the left superior and inferior pubic rami nondisplaced fracture of the left sacral alae at S1 status post percutaneous pinning of the left

## 2024-12-20 NOTE — CARE COORDINATION
Chart reviewed. Pt agreeable to SNF. Requested [facility]. Referral called to Geo. LVM. Awaiting return call.    1415-call back from Geo at [facility]. They are able to accept pt. Pt insurance is showing up [employer] as primary. Precert started by facility.

## 2024-12-20 NOTE — ED NOTES
Pt changed into gown and clothes folded up and given to daughter. Daughter is at bedside. Pt tolerated well.

## 2024-12-20 NOTE — ACP (ADVANCE CARE PLANNING)
Advance Care Planning     General Advance Care Planning (ACP) Conversation    Date of Conversation: 12/20/2024  Conducted with: Patient with Decision Making Capacity  Other persons present: Daughter Kaylin    Healthcare Decision Maker:   Primary Decision Maker: Kaylin Woods [facility] - [phone]       Content/Action Overview:  DECLINED ACP Conversation - will revisit periodically  Reviewed DNR/DNI and patient elects Full Code (Attempt Resuscitation)        Length of Voluntary ACP Conversation in minutes:  <16 minutes (Non-Billable)    Helene Chua RN

## 2024-12-21 NOTE — PROGRESS NOTES
Physician Progress Note      PATIENT:               DENISE CONSTANTINO  [facility] #:                  [id number]  :                       1928  ADMIT DATE:       2024 7:27 PM  DISCH DATE:        2024 7:09 PM  RESPONDING  PROVIDER #:        CLAYTON Hook CNP          QUERY TEXT:    Patient with atrial fibrillation and is maintained on Eliquis. If possible,   please document in progress notes and discharge summary if you are evaluating   and/or treating any of the following:?  ?  The medical record reflects the following:  Risk Factors: htn, age, prior CVA/TIA, gender  Clinical Indicators: [id number] Score=6  Treatment: Eliquis  Options provided:  -- Secondary hypercoagulable state in a patient with atrial fibrillation  -- Other - I will add my own diagnosis  -- Disagree - Not applicable / Not valid  -- Disagree - Clinically unable to determine / Unknown  -- Refer to Clinical Documentation Reviewer    PROVIDER RESPONSE TEXT:    This patient has secondary hypercoagulable state in a patient with atrial   fibrillation.    Query created by: Irish Wilson on 2024 1:40 PM      Electronically signed by:  CLAYTON Hook CNP 2024 9:20 PM

## 2025-01-07 ENCOUNTER — OFFICE VISIT (OUTPATIENT)
Dept: ORTHOPEDIC SURGERY | Age: 89
End: 2025-01-07
Payer: MEDICARE

## 2025-01-07 VITALS — HEIGHT: 66 IN | WEIGHT: 127 LBS | BODY MASS INDEX: 20.41 KG/M2

## 2025-01-07 DIAGNOSIS — S32.592A CLOSED FRACTURE OF RAMUS OF LEFT PUBIS, INITIAL ENCOUNTER (HCC): ICD-10-CM

## 2025-01-07 DIAGNOSIS — R10.2 PAIN IN PELVIS: Primary | ICD-10-CM

## 2025-01-07 PROCEDURE — G8420 CALC BMI NORM PARAMETERS: HCPCS | Performed by: STUDENT IN AN ORGANIZED HEALTH CARE EDUCATION/TRAINING PROGRAM

## 2025-01-07 PROCEDURE — 99213 OFFICE O/P EST LOW 20 MIN: CPT | Performed by: STUDENT IN AN ORGANIZED HEALTH CARE EDUCATION/TRAINING PROGRAM

## 2025-01-07 PROCEDURE — 1123F ACP DISCUSS/DSCN MKR DOCD: CPT | Performed by: STUDENT IN AN ORGANIZED HEALTH CARE EDUCATION/TRAINING PROGRAM

## 2025-01-07 PROCEDURE — 1090F PRES/ABSN URINE INCON ASSESS: CPT | Performed by: STUDENT IN AN ORGANIZED HEALTH CARE EDUCATION/TRAINING PROGRAM

## 2025-01-07 PROCEDURE — G8427 DOCREV CUR MEDS BY ELIG CLIN: HCPCS | Performed by: STUDENT IN AN ORGANIZED HEALTH CARE EDUCATION/TRAINING PROGRAM

## 2025-01-07 PROCEDURE — 1111F DSCHRG MED/CURRENT MED MERGE: CPT | Performed by: STUDENT IN AN ORGANIZED HEALTH CARE EDUCATION/TRAINING PROGRAM

## 2025-01-07 PROCEDURE — 1036F TOBACCO NON-USER: CPT | Performed by: STUDENT IN AN ORGANIZED HEALTH CARE EDUCATION/TRAINING PROGRAM

## 2025-01-07 PROCEDURE — 1159F MED LIST DOCD IN RCRD: CPT | Performed by: STUDENT IN AN ORGANIZED HEALTH CARE EDUCATION/TRAINING PROGRAM

## 2025-01-07 PROCEDURE — M1308 PR FLU IMMUNIZE NO ADMIN: HCPCS | Performed by: STUDENT IN AN ORGANIZED HEALTH CARE EDUCATION/TRAINING PROGRAM

## 2025-01-07 NOTE — PROGRESS NOTES
Chief Complaint  Pelvic Pain (Ck pelvic/)      History of Present Illness:  Lubna Mobley is a pleasant 89 y.o. female here today for her first outpatient evaluation regarding her left hip and pelvis.  The patient sustained a left superior and inferior pubic rami fracture with a minimally displaced left S1 fracture after a fall on 12/19/2024.  The patient has been treated 50% weightbearing and she is currently at the [facility] facility.  She is doing physical therapy and doing well.  She is hoping to go home soon.  She reports a little bit of groin pain and a little bit of posterior lateral hip pain she denies numbness and tingling.      Pain Assessment  Location of Pain: Pelvis  Location Modifiers: Left  Severity of Pain: 5  Quality of Pain: Throbbing, Sharp, Aching  Duration of Pain: Persistent  Frequency of Pain: Constant  Aggravating Factors: Bending, Stretching, Straightening, Standing, Walking, Stairs  Limiting Behavior: Yes  Relieving Factors: Rest  Result of Injury: Yes  Work-Related Injury: No  Are there other pain locations you wish to document?: No    Medical History:  Patient's medications, allergies, past medical, surgical, social and family histories were reviewed and updated as appropriate.    Pertinent items are noted in HPI  Review of systems reviewed from Patient History Form dated on 1/7/25 and available in the patient's chart under the Media tab.       Vital Signs:  There were no vitals filed for this visit.      Constitutional: In no apparent distress. Normal affect. Alert and oriented X3 and is cooperative.       Left hip Examination:    Mildly tender to palpation over the lateral hip.  Tolerates gentle circumduction without much discomfort.  Compartments soft. EHL/FHL/TA/GS complex motor intact. Sural, saphenous, superificial/deep peroneal, and plantar nerve distribution sensation grossly intact. Dorsalis pedis/posterior tibial pulses 2+ with BCR.         Radiology:       3 views of the

## 2025-02-25 ENCOUNTER — OFFICE VISIT (OUTPATIENT)
Dept: ORTHOPEDIC SURGERY | Age: 89
End: 2025-02-25
Payer: MEDICARE

## 2025-02-25 VITALS — BODY MASS INDEX: 19.93 KG/M2 | HEIGHT: 66 IN | WEIGHT: 124 LBS

## 2025-02-25 DIAGNOSIS — R10.2 PAIN IN PELVIS: Primary | ICD-10-CM

## 2025-02-25 DIAGNOSIS — S32.592A CLOSED FRACTURE OF RAMUS OF LEFT PUBIS, INITIAL ENCOUNTER (HCC): ICD-10-CM

## 2025-02-25 PROCEDURE — 1159F MED LIST DOCD IN RCRD: CPT | Performed by: STUDENT IN AN ORGANIZED HEALTH CARE EDUCATION/TRAINING PROGRAM

## 2025-02-25 PROCEDURE — 99213 OFFICE O/P EST LOW 20 MIN: CPT | Performed by: STUDENT IN AN ORGANIZED HEALTH CARE EDUCATION/TRAINING PROGRAM

## 2025-02-25 PROCEDURE — 1126F AMNT PAIN NOTED NONE PRSNT: CPT | Performed by: STUDENT IN AN ORGANIZED HEALTH CARE EDUCATION/TRAINING PROGRAM

## 2025-02-25 PROCEDURE — 1090F PRES/ABSN URINE INCON ASSESS: CPT | Performed by: STUDENT IN AN ORGANIZED HEALTH CARE EDUCATION/TRAINING PROGRAM

## 2025-02-25 PROCEDURE — G8420 CALC BMI NORM PARAMETERS: HCPCS | Performed by: STUDENT IN AN ORGANIZED HEALTH CARE EDUCATION/TRAINING PROGRAM

## 2025-02-25 PROCEDURE — 1123F ACP DISCUSS/DSCN MKR DOCD: CPT | Performed by: STUDENT IN AN ORGANIZED HEALTH CARE EDUCATION/TRAINING PROGRAM

## 2025-02-25 PROCEDURE — 1036F TOBACCO NON-USER: CPT | Performed by: STUDENT IN AN ORGANIZED HEALTH CARE EDUCATION/TRAINING PROGRAM

## 2025-02-25 PROCEDURE — G8427 DOCREV CUR MEDS BY ELIG CLIN: HCPCS | Performed by: STUDENT IN AN ORGANIZED HEALTH CARE EDUCATION/TRAINING PROGRAM

## 2025-02-25 NOTE — PROGRESS NOTES
Chief Complaint  Pelvic Pain (CK LT. PELVIS)      History of Present Illness:  The patient is here for repeat evaluation regarding her left hip and pelvis.  Her pain is greatly improved.  She denies much difficulty getting around and doing what she wants to do day-to-day.  She does have a little bit of lateral left hip pain    Prior HPI 1/7/2025:  Lubna Mobley is a pleasant 89 y.o. female here today for her first outpatient evaluation regarding her left hip and pelvis.  The patient sustained a left superior and inferior pubic rami fracture with a minimally displaced left S1 fracture after a fall on 12/19/2024.  The patient has been treated 50% weightbearing and she is currently at the [facility].  She is doing physical therapy and doing well.  She is hoping to go home soon.  She reports a little bit of groin pain and a little bit of posterior lateral hip pain she denies numbness and tingling.           Medical History:  Patient's medications, allergies, past medical, surgical, social and family histories were reviewed and updated as appropriate.    Pertinent items are noted in HPI  Review of systems reviewed from Patient History Form dated on 2/25/25 and available in the patient's chart under the Media tab.       Vital Signs:  There were no vitals filed for this visit.      Constitutional: In no apparent distress. Normal affect. Alert and oriented X3 and is cooperative.       Left hip Examination:    Mildly tender over the greater trochanter.  Nontender over the ASIS and the iliac crest.  No pain with pelvic compression.  Tolerates gentle circumduction without discomfort.  Compartments soft. EHL/FHL/TA/GS complex motor intact. Sural, saphenous, superificial/deep peroneal, and plantar nerve distribution sensation grossly intact. Dorsalis pedis/posterior tibial pulses 2+ with BCR.         Radiology:       3 views of the pelvis taken in the office today demonstrate minimally displaced left superior and

## 2025-04-19 ENCOUNTER — HOSPITAL ENCOUNTER (EMERGENCY)
Age: 89
Discharge: HOME | End: 2025-04-19
Attending: EMERGENCY MEDICINE
Payer: MEDICARE

## 2025-04-19 ENCOUNTER — APPOINTMENT (OUTPATIENT)
Dept: GENERAL RADIOLOGY | Age: 89
End: 2025-04-19
Payer: MEDICARE

## 2025-04-19 ENCOUNTER — APPOINTMENT (OUTPATIENT)
Dept: CT IMAGING | Age: 89
End: 2025-04-19
Payer: MEDICARE

## 2025-04-19 VITALS
BODY MASS INDEX: 21.92 KG/M2 | RESPIRATION RATE: 15 BRPM | TEMPERATURE: 98.9 F | HEART RATE: 77 BPM | DIASTOLIC BLOOD PRESSURE: 73 MMHG | OXYGEN SATURATION: 99 % | SYSTOLIC BLOOD PRESSURE: 179 MMHG | WEIGHT: 136.4 LBS | HEIGHT: 66 IN

## 2025-04-19 DIAGNOSIS — N17.9 AKI (ACUTE KIDNEY INJURY): ICD-10-CM

## 2025-04-19 DIAGNOSIS — N30.00 ACUTE CYSTITIS WITHOUT HEMATURIA: Primary | ICD-10-CM

## 2025-04-19 LAB
ALBUMIN SERPL-MCNC: 3.6 G/DL (ref 3.4–5)
ALBUMIN/GLOB SERPL: 1.1 {RATIO} (ref 1.1–2.2)
ALP SERPL-CCNC: 108 U/L (ref 40–129)
ALT SERPL-CCNC: 13 U/L (ref 10–40)
ANION GAP SERPL CALCULATED.3IONS-SCNC: 12 MMOL/L (ref 3–16)
AST SERPL-CCNC: 25 U/L (ref 15–37)
BACTERIA URNS QL MICRO: ABNORMAL /HPF
BASOPHILS # BLD: 0 K/UL (ref 0–0.2)
BASOPHILS NFR BLD: 0.5 %
BILIRUB SERPL-MCNC: 0.3 MG/DL (ref 0–1)
BILIRUB UR QL STRIP.AUTO: NEGATIVE
BUN SERPL-MCNC: 35 MG/DL (ref 7–20)
CALCIUM SERPL-MCNC: 9.5 MG/DL (ref 8.3–10.6)
CHLORIDE SERPL-SCNC: 107 MMOL/L (ref 99–110)
CLARITY UR: CLEAR
CO2 SERPL-SCNC: 22 MMOL/L (ref 21–32)
COLOR UR: YELLOW
CREAT SERPL-MCNC: 1.6 MG/DL (ref 0.6–1.2)
D-DIMER QUANTITATIVE: <0.27 UG/ML FEU (ref 0–0.6)
DEPRECATED RDW RBC AUTO: 13.6 % (ref 12.4–15.4)
EOSINOPHIL # BLD: 0 K/UL (ref 0–0.6)
EOSINOPHIL NFR BLD: 0.6 %
EPI CELLS #/AREA URNS HPF: ABNORMAL /HPF (ref 0–5)
FLUAV RNA RESP QL NAA+PROBE: NOT DETECTED
FLUBV RNA RESP QL NAA+PROBE: NOT DETECTED
GFR SERPLBLD CREATININE-BSD FMLA CKD-EPI: 29 ML/MIN/{1.73_M2}
GLUCOSE SERPL-MCNC: 146 MG/DL (ref 70–99)
GLUCOSE UR STRIP.AUTO-MCNC: NEGATIVE MG/DL
HCT VFR BLD AUTO: 37.5 % (ref 36–48)
HGB BLD-MCNC: 12.7 G/DL (ref 12–16)
HGB UR QL STRIP.AUTO: ABNORMAL
KETONES UR STRIP.AUTO-MCNC: NEGATIVE MG/DL
LEUKOCYTE ESTERASE UR QL STRIP.AUTO: ABNORMAL
LYMPHOCYTES # BLD: 1.7 K/UL (ref 1–5.1)
LYMPHOCYTES NFR BLD: 23.2 %
MCH RBC QN AUTO: 31.7 PG (ref 26–34)
MCHC RBC AUTO-ENTMCNC: 33.8 G/DL (ref 31–36)
MCV RBC AUTO: 93.7 FL (ref 80–100)
MONOCYTES # BLD: 1 K/UL (ref 0–1.3)
MONOCYTES NFR BLD: 13.7 %
NEUTROPHILS # BLD: 4.5 K/UL (ref 1.7–7.7)
NEUTROPHILS NFR BLD: 62 %
NITRITE UR QL STRIP.AUTO: POSITIVE
PH UR STRIP.AUTO: 6.5 [PH] (ref 5–8)
PLATELET # BLD AUTO: 194 K/UL (ref 135–450)
PMV BLD AUTO: 6.7 FL (ref 5–10.5)
POTASSIUM SERPL-SCNC: 4.2 MMOL/L (ref 3.5–5.1)
PROT SERPL-MCNC: 6.8 G/DL (ref 6.4–8.2)
PROT UR STRIP.AUTO-MCNC: NEGATIVE MG/DL
RBC # BLD AUTO: 4 M/UL (ref 4–5.2)
RBC #/AREA URNS HPF: ABNORMAL /HPF (ref 0–4)
SARS-COV-2 RNA RESP QL NAA+PROBE: NOT DETECTED
SODIUM SERPL-SCNC: 141 MMOL/L (ref 136–145)
SP GR UR STRIP.AUTO: 1.01 (ref 1–1.03)
TROPONIN, HIGH SENSITIVITY: 20 NG/L (ref 0–14)
TROPONIN, HIGH SENSITIVITY: 21 NG/L (ref 0–14)
UA COMPLETE W REFLEX CULTURE PNL UR: YES
UA DIPSTICK W REFLEX MICRO PNL UR: YES
URN SPEC COLLECT METH UR: ABNORMAL
UROBILINOGEN UR STRIP-ACNC: 0.2 E.U./DL
WBC # BLD AUTO: 7.3 K/UL (ref 4–11)
WBC #/AREA URNS HPF: ABNORMAL /HPF (ref 0–5)

## 2025-04-19 PROCEDURE — 2500000003 HC RX 250 WO HCPCS: Performed by: EMERGENCY MEDICINE

## 2025-04-19 PROCEDURE — 85379 FIBRIN DEGRADATION QUANT: CPT

## 2025-04-19 PROCEDURE — 96361 HYDRATE IV INFUSION ADD-ON: CPT

## 2025-04-19 PROCEDURE — 87086 URINE CULTURE/COLONY COUNT: CPT

## 2025-04-19 PROCEDURE — 93005 ELECTROCARDIOGRAM TRACING: CPT | Performed by: EMERGENCY MEDICINE

## 2025-04-19 PROCEDURE — 6370000000 HC RX 637 (ALT 250 FOR IP): Performed by: EMERGENCY MEDICINE

## 2025-04-19 PROCEDURE — 81001 URINALYSIS AUTO W/SCOPE: CPT

## 2025-04-19 PROCEDURE — 87077 CULTURE AEROBIC IDENTIFY: CPT

## 2025-04-19 PROCEDURE — 96374 THER/PROPH/DIAG INJ IV PUSH: CPT

## 2025-04-19 PROCEDURE — 6360000002 HC RX W HCPCS: Performed by: EMERGENCY MEDICINE

## 2025-04-19 PROCEDURE — 71045 X-RAY EXAM CHEST 1 VIEW: CPT

## 2025-04-19 PROCEDURE — 2580000003 HC RX 258: Performed by: EMERGENCY MEDICINE

## 2025-04-19 PROCEDURE — 87636 SARSCOV2 & INF A&B AMP PRB: CPT

## 2025-04-19 PROCEDURE — 84443 ASSAY THYROID STIM HORMONE: CPT

## 2025-04-19 PROCEDURE — 85025 COMPLETE CBC W/AUTO DIFF WBC: CPT

## 2025-04-19 PROCEDURE — 72125 CT NECK SPINE W/O DYE: CPT

## 2025-04-19 PROCEDURE — 87186 SC STD MICRODIL/AGAR DIL: CPT

## 2025-04-19 PROCEDURE — 80053 COMPREHEN METABOLIC PANEL: CPT

## 2025-04-19 PROCEDURE — 84484 ASSAY OF TROPONIN QUANT: CPT

## 2025-04-19 PROCEDURE — 99285 EMERGENCY DEPT VISIT HI MDM: CPT

## 2025-04-19 PROCEDURE — 70450 CT HEAD/BRAIN W/O DYE: CPT

## 2025-04-19 RX ORDER — BUTALBITAL, ACETAMINOPHEN AND CAFFEINE 50; 325; 40 MG/1; MG/1; MG/1
1 TABLET ORAL EVERY 4 HOURS PRN
Status: DISCONTINUED | OUTPATIENT
Start: 2025-04-19 | End: 2025-04-19 | Stop reason: HOSPADM

## 2025-04-19 RX ORDER — CEFDINIR 300 MG/1
300 CAPSULE ORAL 2 TIMES DAILY
Qty: 14 CAPSULE | Refills: 0 | Status: SHIPPED | OUTPATIENT
Start: 2025-04-19 | End: 2025-04-26

## 2025-04-19 RX ORDER — 0.9 % SODIUM CHLORIDE 0.9 %
500 INTRAVENOUS SOLUTION INTRAVENOUS ONCE
Status: COMPLETED | OUTPATIENT
Start: 2025-04-19 | End: 2025-04-19

## 2025-04-19 RX ADMIN — BUTALBITAL, ACETAMINOPHEN AND CAFFEINE 1 TABLET: 325; 50; 40 TABLET ORAL at 15:17

## 2025-04-19 RX ADMIN — CEFTRIAXONE SODIUM 1000 MG: 1 INJECTION, POWDER, FOR SOLUTION INTRAMUSCULAR; INTRAVENOUS at 17:36

## 2025-04-19 RX ADMIN — SODIUM CHLORIDE 500 ML: 0.9 INJECTION, SOLUTION INTRAVENOUS at 16:50

## 2025-04-19 ASSESSMENT — PAIN DESCRIPTION - ORIENTATION: ORIENTATION: RIGHT

## 2025-04-19 ASSESSMENT — PAIN SCALES - GENERAL
PAINLEVEL_OUTOF10: 8
PAINLEVEL_OUTOF10: 6
PAINLEVEL_OUTOF10: 10

## 2025-04-19 ASSESSMENT — PAIN DESCRIPTION - LOCATION
LOCATION: NECK
LOCATION: NECK;SHOULDER

## 2025-04-19 ASSESSMENT — PAIN - FUNCTIONAL ASSESSMENT: PAIN_FUNCTIONAL_ASSESSMENT: 0-10

## 2025-04-19 NOTE — ED PROVIDER NOTES
[facility] EMERGENCY DEPARTMENT    EMERGENCY DEPARTMENT ENCOUNTER        Patient Name: Lubna Mobley  MRN: [id number]  Birthdate 2/1/1928  Date of evaluation: 4/19/2025  PCP: Sheldon Ortiz MD  Note Started: 4:14 PM EDT 4/19/25    CHIEF COMPLAINT       Pain (Pt is having neck and bilateral shoulder pain for the past 3x days. /Pt states she felt like she slept on her neck/shoulders weird. )      HISTORY OF PRESENT ILLNESS: 1 or more Elements       Lubna Mobley is a 89 y.o. female  who presents to the ED for evaluation of neck pain. Reports over the past 3 days, she has been feeling like she slept on it wrong. She has been having sharp pain to the sides of the neck when she turns her head side to side. No falls or injuries. No associated symptoms such as fevers, chills, cough, nausea, vomiting, chest pain, or abdominal pain. Reports the pain radiates down to the head/jaw.     No other complaints, modifying factors or associated symptoms.     History obtained by the patient unless stated otherwise as above on HPI.   No limitations unless specified as above on HPI.     Past medical history:   Past Medical History:   Diagnosis Date    A-fib (HCC)     Achilles tendon injury 8/23/2016    Arthritis     Coronary artery disease involving native coronary artery of native heart without angina pectoris 10/16/2022    Hyperlipidemia     Hypertension     Kidney insufficiency     MI, old     PONV (postoperative nausea and vomiting)     Urinary frequency     UTI (urinary tract infection) 8/19/2018       Past surgical history:   Past Surgical History:   Procedure Laterality Date    ACHILLES TENDON SURGERY  03/02/2017    R achilles tendon debridement     BACK SURGERY  2003    back surgery x3; lower back    CARPAL TUNNEL RELEASE Right 2002    CORONARY ANGIOPLASTY WITH STENT PLACEMENT  2006    HIP SURGERY Left 1/21/2016    hip pinning    HYSTERECTOMY (CERVIX STATUS UNKNOWN)  1965       Home medications:   Prior to  Admission medications    Medication Sig Start Date End Date Taking? Authorizing Provider   cefdinir (OMNICEF) 300 MG capsule Take 1 capsule by mouth 2 times daily for 7 days 4/19/25 4/26/25 Yes Drea Villarreal MD   metoprolol tartrate (LOPRESSOR) 25 MG tablet Take 1 tablet by mouth 2 times daily 10/28/24   Matthew Cunningham MD   ELIQUIS 2.5 MG TABS tablet TK 1 T PO BID 8/13/16   Matthew Cunningham MD   acetaminophen 650 MG TABS Take 650 mg by mouth every 4 hours as needed 1/24/16   June Pedersen PA   lisinopril (PRINIVIL;ZESTRIL) 10 MG tablet Take 1 tablet by mouth daily    ProviderMatthew MD       Social history:   Social History     Tobacco Use    Smoking status: Never    Smokeless tobacco: Never   Vaping Use    Vaping status: Never Used   Substance Use Topics    Alcohol use: No    Drug use: No       Family history:  History reviewed. No pertinent family history.    Allergies:   Allergies   Allergen Reactions    Epinephrine      \"knocks me out\"    Other      Novocaine - when at the dentist       PMH, Surgical Hx, FH, Social Hx reviewed by myself.   Patient's care impacted by the above conditions.    REVIEW OF SYSTEMS :      Review of Systems  10 systems reviewed, pertinent positives per HPI otherwise noted to be negative.      SCREENINGS        [place] Coma Scale  Eye Opening: Spontaneous  Best Verbal Response: Oriented  Best Motor Response: Obeys commands  [place] Coma Scale Score: 15                CIWA Assessment  BP: (!) 179/73  Pulse: 77           PHYSICAL EXAM  1 or more Elements     ED Triage Vitals   BP Systolic BP Percentile Diastolic BP Percentile Temp Temp Source Pulse Respirations SpO2   04/19/25 1412 -- -- 04/19/25 1412 04/19/25 1412 04/19/25 1412 04/19/25 1412 04/19/25 1412   (!) 166/83   98.9 °F (37.2 °C) Oral 75 18 97 %      Height Weight - Scale         04/19/25 1412 04/19/25 1449         1.676 m (5' 6\") 61.9 kg (136 lb 6.4 oz)             GENERAL APPEARANCE: Awake and alert.

## 2025-04-19 NOTE — ED NOTES
Dr Villarreal at bedside to speak with pt and son regarding admission status.  Pt wishes to sign out AMA at this time.  Discussion with ED attending Phil with pt and son at bedside.  Will be given prescription for omnicef.  Verbal understanding from pt and son.

## 2025-04-20 ENCOUNTER — RESULTS FOLLOW-UP (OUTPATIENT)
Dept: EMERGENCY DEPT | Age: 89
End: 2025-04-20

## 2025-04-20 LAB
BACTERIA UR CULT: ABNORMAL
EKG ATRIAL RATE: 73 BPM
EKG DIAGNOSIS: NORMAL
EKG P-R INTERVAL: 312 MS
EKG Q-T INTERVAL: 368 MS
EKG QRS DURATION: 102 MS
EKG QTC CALCULATION (BAZETT): 405 MS
EKG R AXIS: -28 DEGREES
EKG T AXIS: -33 DEGREES
EKG VENTRICULAR RATE: 73 BPM
ORGANISM: ABNORMAL
TSH SERPL DL<=0.005 MIU/L-ACNC: 4.07 UIU/ML (ref 0.27–4.2)

## 2025-04-21 LAB
BACTERIA UR CULT: ABNORMAL
ORGANISM: ABNORMAL

## 2025-08-16 ENCOUNTER — HOSPITAL ENCOUNTER (INPATIENT)
Age: 89
LOS: 3 days | Discharge: HOME | DRG: 948 | End: 2025-08-19
Attending: EMERGENCY MEDICINE | Admitting: INTERNAL MEDICINE
Payer: MEDICARE

## 2025-08-16 ENCOUNTER — APPOINTMENT (OUTPATIENT)
Dept: GENERAL RADIOLOGY | Age: 89
DRG: 948 | End: 2025-08-16
Payer: MEDICARE

## 2025-08-16 DIAGNOSIS — N18.9 CHRONIC KIDNEY DISEASE, UNSPECIFIED CKD STAGE: Primary | ICD-10-CM

## 2025-08-16 DIAGNOSIS — R60.0 PERIPHERAL EDEMA: ICD-10-CM

## 2025-08-16 DIAGNOSIS — R79.89 ELEVATED TROPONIN: ICD-10-CM

## 2025-08-16 DIAGNOSIS — R79.89 ELEVATED BRAIN NATRIURETIC PEPTIDE (BNP) LEVEL: ICD-10-CM

## 2025-08-16 DIAGNOSIS — R06.02 SHORTNESS OF BREATH: ICD-10-CM

## 2025-08-16 LAB
ALBUMIN SERPL-MCNC: 3.8 G/DL (ref 3.4–5)
ALBUMIN/GLOB SERPL: 1.5 {RATIO} (ref 1.1–2.2)
ALP SERPL-CCNC: 90 U/L (ref 40–129)
ALT SERPL-CCNC: 11 U/L (ref 10–40)
ANION GAP SERPL CALCULATED.3IONS-SCNC: 10 MMOL/L (ref 3–16)
AST SERPL-CCNC: 21 U/L (ref 15–37)
BACTERIA URNS QL MICRO: ABNORMAL /HPF
BASOPHILS # BLD: 0 K/UL (ref 0–0.2)
BASOPHILS NFR BLD: 0.9 %
BILIRUB SERPL-MCNC: 0.3 MG/DL (ref 0–1)
BILIRUB UR QL STRIP.AUTO: NEGATIVE
BUN SERPL-MCNC: 32 MG/DL (ref 7–20)
CALCIUM SERPL-MCNC: 9.6 MG/DL (ref 8.3–10.6)
CHLORIDE SERPL-SCNC: 109 MMOL/L (ref 99–110)
CLARITY UR: CLEAR
CO2 SERPL-SCNC: 21 MMOL/L (ref 21–32)
COLOR UR: YELLOW
CREAT SERPL-MCNC: 1.5 MG/DL (ref 0.6–1.2)
D-DIMER QUANTITATIVE: 0.28 UG/ML FEU (ref 0–0.6)
DEPRECATED RDW RBC AUTO: 14.8 % (ref 12.4–15.4)
EOSINOPHIL # BLD: 0.1 K/UL (ref 0–0.6)
EOSINOPHIL NFR BLD: 1.9 %
GFR SERPLBLD CREATININE-BSD FMLA CKD-EPI: 31 ML/MIN/{1.73_M2}
GLUCOSE SERPL-MCNC: 118 MG/DL (ref 70–99)
GLUCOSE UR STRIP.AUTO-MCNC: NEGATIVE MG/DL
HCT VFR BLD AUTO: 32.4 % (ref 36–48)
HGB BLD-MCNC: 10.9 G/DL (ref 12–16)
HGB UR QL STRIP.AUTO: NEGATIVE
KETONES UR STRIP.AUTO-MCNC: NEGATIVE MG/DL
LEUKOCYTE ESTERASE UR QL STRIP.AUTO: ABNORMAL
LYMPHOCYTES # BLD: 1.3 K/UL (ref 1–5.1)
LYMPHOCYTES NFR BLD: 29.6 %
MCH RBC QN AUTO: 30.4 PG (ref 26–34)
MCHC RBC AUTO-ENTMCNC: 33.6 G/DL (ref 31–36)
MCV RBC AUTO: 90.2 FL (ref 80–100)
MONOCYTES # BLD: 0.5 K/UL (ref 0–1.3)
MONOCYTES NFR BLD: 10.8 %
NEUTROPHILS # BLD: 2.4 K/UL (ref 1.7–7.7)
NEUTROPHILS NFR BLD: 56.8 %
NITRITE UR QL STRIP.AUTO: POSITIVE
NT-PROBNP SERPL-MCNC: 2184 PG/ML (ref 0–449)
PH UR STRIP.AUTO: 6.5 [PH] (ref 5–8)
PLATELET # BLD AUTO: 175 K/UL (ref 135–450)
PMV BLD AUTO: 6.6 FL (ref 5–10.5)
POTASSIUM SERPL-SCNC: 4.4 MMOL/L (ref 3.5–5.1)
PROT SERPL-MCNC: 6.4 G/DL (ref 6.4–8.2)
PROT UR STRIP.AUTO-MCNC: NEGATIVE MG/DL
RBC # BLD AUTO: 3.59 M/UL (ref 4–5.2)
RBC #/AREA URNS HPF: ABNORMAL /HPF (ref 0–4)
SODIUM SERPL-SCNC: 140 MMOL/L (ref 136–145)
SP GR UR STRIP.AUTO: 1.01 (ref 1–1.03)
TROPONIN, HIGH SENSITIVITY: 22 NG/L (ref 0–14)
TROPONIN, HIGH SENSITIVITY: 25 NG/L (ref 0–14)
UA DIPSTICK W REFLEX MICRO PNL UR: YES
URN SPEC COLLECT METH UR: ABNORMAL
UROBILINOGEN UR STRIP-ACNC: 0.2 E.U./DL
WBC # BLD AUTO: 4.3 K/UL (ref 4–11)
WBC #/AREA URNS HPF: ABNORMAL /HPF (ref 0–5)

## 2025-08-16 PROCEDURE — 1200000000 HC SEMI PRIVATE

## 2025-08-16 PROCEDURE — 2500000003 HC RX 250 WO HCPCS: Performed by: INTERNAL MEDICINE

## 2025-08-16 PROCEDURE — 82728 ASSAY OF FERRITIN: CPT

## 2025-08-16 PROCEDURE — 81001 URINALYSIS AUTO W/SCOPE: CPT

## 2025-08-16 PROCEDURE — 96374 THER/PROPH/DIAG INJ IV PUSH: CPT

## 2025-08-16 PROCEDURE — 83540 ASSAY OF IRON: CPT

## 2025-08-16 PROCEDURE — 87088 URINE BACTERIA CULTURE: CPT

## 2025-08-16 PROCEDURE — 85379 FIBRIN DEGRADATION QUANT: CPT

## 2025-08-16 PROCEDURE — 99285 EMERGENCY DEPT VISIT HI MDM: CPT

## 2025-08-16 PROCEDURE — 83880 ASSAY OF NATRIURETIC PEPTIDE: CPT

## 2025-08-16 PROCEDURE — 87086 URINE CULTURE/COLONY COUNT: CPT

## 2025-08-16 PROCEDURE — 71045 X-RAY EXAM CHEST 1 VIEW: CPT

## 2025-08-16 PROCEDURE — 87186 SC STD MICRODIL/AGAR DIL: CPT

## 2025-08-16 PROCEDURE — 84484 ASSAY OF TROPONIN QUANT: CPT

## 2025-08-16 PROCEDURE — 36415 COLL VENOUS BLD VENIPUNCTURE: CPT

## 2025-08-16 PROCEDURE — 93005 ELECTROCARDIOGRAM TRACING: CPT | Performed by: EMERGENCY MEDICINE

## 2025-08-16 PROCEDURE — 6370000000 HC RX 637 (ALT 250 FOR IP): Performed by: INTERNAL MEDICINE

## 2025-08-16 PROCEDURE — 80053 COMPREHEN METABOLIC PANEL: CPT

## 2025-08-16 PROCEDURE — 83550 IRON BINDING TEST: CPT

## 2025-08-16 PROCEDURE — 6360000002 HC RX W HCPCS: Performed by: EMERGENCY MEDICINE

## 2025-08-16 PROCEDURE — 85025 COMPLETE CBC W/AUTO DIFF WBC: CPT

## 2025-08-16 RX ORDER — ONDANSETRON 2 MG/ML
4 INJECTION INTRAMUSCULAR; INTRAVENOUS EVERY 6 HOURS PRN
Status: DISCONTINUED | OUTPATIENT
Start: 2025-08-16 | End: 2025-08-19 | Stop reason: HOSPADM

## 2025-08-16 RX ORDER — ACETAMINOPHEN 650 MG/1
650 SUPPOSITORY RECTAL EVERY 6 HOURS PRN
Status: DISCONTINUED | OUTPATIENT
Start: 2025-08-16 | End: 2025-08-19 | Stop reason: HOSPADM

## 2025-08-16 RX ORDER — POLYETHYLENE GLYCOL 3350 17 G/17G
17 POWDER, FOR SOLUTION ORAL DAILY PRN
Status: DISCONTINUED | OUTPATIENT
Start: 2025-08-16 | End: 2025-08-19 | Stop reason: HOSPADM

## 2025-08-16 RX ORDER — SODIUM CHLORIDE 0.9 % (FLUSH) 0.9 %
5-40 SYRINGE (ML) INJECTION PRN
Status: DISCONTINUED | OUTPATIENT
Start: 2025-08-16 | End: 2025-08-19 | Stop reason: HOSPADM

## 2025-08-16 RX ORDER — SODIUM CHLORIDE 9 MG/ML
INJECTION, SOLUTION INTRAVENOUS PRN
Status: DISCONTINUED | OUTPATIENT
Start: 2025-08-16 | End: 2025-08-19 | Stop reason: HOSPADM

## 2025-08-16 RX ORDER — FUROSEMIDE 10 MG/ML
20 INJECTION INTRAMUSCULAR; INTRAVENOUS 2 TIMES DAILY
Status: DISCONTINUED | OUTPATIENT
Start: 2025-08-17 | End: 2025-08-19 | Stop reason: HOSPADM

## 2025-08-16 RX ORDER — ONDANSETRON 4 MG/1
4 TABLET, ORALLY DISINTEGRATING ORAL EVERY 8 HOURS PRN
Status: DISCONTINUED | OUTPATIENT
Start: 2025-08-16 | End: 2025-08-19 | Stop reason: HOSPADM

## 2025-08-16 RX ORDER — METOPROLOL TARTRATE 25 MG/1
25 TABLET, FILM COATED ORAL 2 TIMES DAILY
Status: DISCONTINUED | OUTPATIENT
Start: 2025-08-16 | End: 2025-08-19 | Stop reason: HOSPADM

## 2025-08-16 RX ORDER — FUROSEMIDE 10 MG/ML
20 INJECTION INTRAMUSCULAR; INTRAVENOUS ONCE
Status: COMPLETED | OUTPATIENT
Start: 2025-08-16 | End: 2025-08-16

## 2025-08-16 RX ORDER — ACETAMINOPHEN 325 MG/1
650 TABLET ORAL EVERY 6 HOURS PRN
Status: DISCONTINUED | OUTPATIENT
Start: 2025-08-16 | End: 2025-08-19 | Stop reason: HOSPADM

## 2025-08-16 RX ORDER — SODIUM CHLORIDE 0.9 % (FLUSH) 0.9 %
5-40 SYRINGE (ML) INJECTION EVERY 12 HOURS SCHEDULED
Status: DISCONTINUED | OUTPATIENT
Start: 2025-08-16 | End: 2025-08-19 | Stop reason: HOSPADM

## 2025-08-16 RX ADMIN — FUROSEMIDE 20 MG: 10 INJECTION, SOLUTION INTRAMUSCULAR; INTRAVENOUS at 15:20

## 2025-08-16 RX ADMIN — APIXABAN 2.5 MG: 2.5 TABLET, FILM COATED ORAL at 20:20

## 2025-08-16 RX ADMIN — SODIUM CHLORIDE, PRESERVATIVE FREE 10 ML: 5 INJECTION INTRAVENOUS at 20:20

## 2025-08-16 RX ADMIN — METOPROLOL TARTRATE 25 MG: 25 TABLET, FILM COATED ORAL at 20:20

## 2025-08-16 ASSESSMENT — PAIN SCALES - GENERAL: PAINLEVEL_OUTOF10: 0

## 2025-08-17 LAB
ALBUMIN SERPL-MCNC: 3.7 G/DL (ref 3.4–5)
ALP SERPL-CCNC: 79 U/L (ref 40–129)
ALT SERPL-CCNC: 9 U/L (ref 10–40)
ANION GAP SERPL CALCULATED.3IONS-SCNC: 8 MMOL/L (ref 3–16)
AST SERPL-CCNC: 20 U/L (ref 15–37)
BILIRUB DIRECT SERPL-MCNC: 0.2 MG/DL (ref 0–0.3)
BILIRUB INDIRECT SERPL-MCNC: 0.2 MG/DL (ref 0–1)
BILIRUB SERPL-MCNC: 0.4 MG/DL (ref 0–1)
BUN SERPL-MCNC: 31 MG/DL (ref 7–20)
CALCIUM SERPL-MCNC: 9.4 MG/DL (ref 8.3–10.6)
CHLORIDE SERPL-SCNC: 107 MMOL/L (ref 99–110)
CO2 SERPL-SCNC: 26 MMOL/L (ref 21–32)
CREAT SERPL-MCNC: 1.4 MG/DL (ref 0.6–1.2)
EKG ATRIAL RATE: 58 BPM
EKG DIAGNOSIS: NORMAL
EKG P AXIS: 2 DEGREES
EKG P-R INTERVAL: 322 MS
EKG Q-T INTERVAL: 422 MS
EKG QRS DURATION: 98 MS
EKG QTC CALCULATION (BAZETT): 414 MS
EKG R AXIS: 99 DEGREES
EKG T AXIS: 41 DEGREES
EKG VENTRICULAR RATE: 58 BPM
FERRITIN SERPL IA-MCNC: 150 NG/ML (ref 15–150)
GFR SERPLBLD CREATININE-BSD FMLA CKD-EPI: 34 ML/MIN/{1.73_M2}
GLUCOSE SERPL-MCNC: 85 MG/DL (ref 70–99)
IRON SATN MFR SERPL: 29 % (ref 15–50)
IRON SERPL-MCNC: 73 UG/DL (ref 37–145)
MAGNESIUM SERPL-MCNC: 2.19 MG/DL (ref 1.8–2.4)
POTASSIUM SERPL-SCNC: 4.2 MMOL/L (ref 3.5–5.1)
PROT SERPL-MCNC: 6.1 G/DL (ref 6.4–8.2)
SODIUM SERPL-SCNC: 141 MMOL/L (ref 136–145)
TIBC SERPL-MCNC: 254 UG/DL (ref 260–445)

## 2025-08-17 PROCEDURE — 2500000003 HC RX 250 WO HCPCS: Performed by: INTERNAL MEDICINE

## 2025-08-17 PROCEDURE — 2500000003 HC RX 250 WO HCPCS: Performed by: STUDENT IN AN ORGANIZED HEALTH CARE EDUCATION/TRAINING PROGRAM

## 2025-08-17 PROCEDURE — 80048 BASIC METABOLIC PNL TOTAL CA: CPT

## 2025-08-17 PROCEDURE — 93010 ELECTROCARDIOGRAM REPORT: CPT | Performed by: INTERNAL MEDICINE

## 2025-08-17 PROCEDURE — 6370000000 HC RX 637 (ALT 250 FOR IP): Performed by: INTERNAL MEDICINE

## 2025-08-17 PROCEDURE — 83735 ASSAY OF MAGNESIUM: CPT

## 2025-08-17 PROCEDURE — 80076 HEPATIC FUNCTION PANEL: CPT

## 2025-08-17 PROCEDURE — 36415 COLL VENOUS BLD VENIPUNCTURE: CPT

## 2025-08-17 PROCEDURE — 6360000002 HC RX W HCPCS: Performed by: STUDENT IN AN ORGANIZED HEALTH CARE EDUCATION/TRAINING PROGRAM

## 2025-08-17 PROCEDURE — 6360000002 HC RX W HCPCS: Performed by: INTERNAL MEDICINE

## 2025-08-17 PROCEDURE — 1200000000 HC SEMI PRIVATE

## 2025-08-17 RX ADMIN — SODIUM CHLORIDE, PRESERVATIVE FREE 10 ML: 5 INJECTION INTRAVENOUS at 07:40

## 2025-08-17 RX ADMIN — APIXABAN 2.5 MG: 2.5 TABLET, FILM COATED ORAL at 21:21

## 2025-08-17 RX ADMIN — FUROSEMIDE 20 MG: 10 INJECTION, SOLUTION INTRAMUSCULAR; INTRAVENOUS at 16:38

## 2025-08-17 RX ADMIN — FUROSEMIDE 20 MG: 10 INJECTION, SOLUTION INTRAMUSCULAR; INTRAVENOUS at 07:38

## 2025-08-17 RX ADMIN — ACETAMINOPHEN 650 MG: 325 TABLET ORAL at 11:35

## 2025-08-17 RX ADMIN — METOPROLOL TARTRATE 25 MG: 25 TABLET, FILM COATED ORAL at 21:21

## 2025-08-17 RX ADMIN — WATER 1000 MG: 1 INJECTION INTRAMUSCULAR; INTRAVENOUS; SUBCUTANEOUS at 07:43

## 2025-08-17 RX ADMIN — SODIUM CHLORIDE, PRESERVATIVE FREE 10 ML: 5 INJECTION INTRAVENOUS at 21:21

## 2025-08-17 RX ADMIN — APIXABAN 2.5 MG: 2.5 TABLET, FILM COATED ORAL at 07:38

## 2025-08-17 ASSESSMENT — PAIN DESCRIPTION - DESCRIPTORS: DESCRIPTORS: CRAMPING

## 2025-08-17 ASSESSMENT — PAIN DESCRIPTION - LOCATION: LOCATION: LEG

## 2025-08-17 ASSESSMENT — PAIN SCALES - GENERAL: PAINLEVEL_OUTOF10: 0

## 2025-08-17 ASSESSMENT — PAIN - FUNCTIONAL ASSESSMENT: PAIN_FUNCTIONAL_ASSESSMENT: 0-10

## 2025-08-18 ENCOUNTER — APPOINTMENT (OUTPATIENT)
Age: 89
DRG: 948 | End: 2025-08-18
Attending: STUDENT IN AN ORGANIZED HEALTH CARE EDUCATION/TRAINING PROGRAM
Payer: MEDICARE

## 2025-08-18 LAB
ANION GAP SERPL CALCULATED.3IONS-SCNC: 11 MMOL/L (ref 3–16)
BASOPHILS # BLD: 0 K/UL (ref 0–0.2)
BASOPHILS NFR BLD: 0.8 %
BUN SERPL-MCNC: 41 MG/DL (ref 7–20)
CALCIUM SERPL-MCNC: 10 MG/DL (ref 8.3–10.6)
CHLORIDE SERPL-SCNC: 101 MMOL/L (ref 99–110)
CO2 SERPL-SCNC: 27 MMOL/L (ref 21–32)
CREAT SERPL-MCNC: 1.6 MG/DL (ref 0.6–1.2)
DEPRECATED RDW RBC AUTO: 14.5 % (ref 12.4–15.4)
ECHO AO ASC DIAM: 2.7 CM
ECHO AO ASCENDING AORTA INDEX: 1.63 CM/M2
ECHO AO ROOT DIAM: 2.8 CM
ECHO AO ROOT INDEX: 1.69 CM/M2
ECHO AV AREA PEAK VELOCITY: 1.6 CM2
ECHO AV AREA VTI: 1.6 CM2
ECHO AV AREA/BSA PEAK VELOCITY: 1 CM2/M2
ECHO AV AREA/BSA VTI: 1 CM2/M2
ECHO AV MEAN GRADIENT: 11 MMHG
ECHO AV MEAN VELOCITY: 1.5 M/S
ECHO AV PEAK GRADIENT: 21 MMHG
ECHO AV PEAK VELOCITY: 2.3 M/S
ECHO AV VELOCITY RATIO: 0.48
ECHO AV VTI: 46.6 CM
ECHO BSA: 1.66 M2
ECHO EST RA PRESSURE: 3 MMHG
ECHO LA AREA 2C: 19.5 CM2
ECHO LA AREA 4C: 17.8 CM2
ECHO LA DIAMETER INDEX: 2.23 CM/M2
ECHO LA DIAMETER: 3.7 CM
ECHO LA MAJOR AXIS: 5.1 CM
ECHO LA MINOR AXIS: 5.2 CM
ECHO LA TO AORTIC ROOT RATIO: 1.32
ECHO LA VOL BP: 54 ML (ref 22–52)
ECHO LA VOL MOD A2C: 60 ML (ref 22–52)
ECHO LA VOL MOD A4C: 48 ML (ref 22–52)
ECHO LA VOL/BSA BIPLANE: 33 ML/M2 (ref 16–34)
ECHO LA VOLUME INDEX MOD A2C: 36 ML/M2 (ref 16–34)
ECHO LA VOLUME INDEX MOD A4C: 29 ML/M2 (ref 16–34)
ECHO LV E' LATERAL VELOCITY: 7.83 CM/S
ECHO LV E' SEPTAL VELOCITY: 5.98 CM/S
ECHO LV EF PHYSICIAN: 60 %
ECHO LV FRACTIONAL SHORTENING: 43 % (ref 28–44)
ECHO LV INTERNAL DIMENSION DIASTOLE INDEX: 2.83 CM/M2
ECHO LV INTERNAL DIMENSION DIASTOLIC: 4.7 CM (ref 3.9–5.3)
ECHO LV INTERNAL DIMENSION SYSTOLIC INDEX: 1.63 CM/M2
ECHO LV INTERNAL DIMENSION SYSTOLIC: 2.7 CM
ECHO LV ISOVOLUMETRIC RELAXATION TIME (IVRT): 85 MS
ECHO LV IVSD: 0.8 CM (ref 0.6–0.9)
ECHO LV MASS 2D: 132.3 G (ref 67–162)
ECHO LV MASS INDEX 2D: 79.7 G/M2 (ref 43–95)
ECHO LV POSTERIOR WALL DIASTOLIC: 0.9 CM (ref 0.6–0.9)
ECHO LV RELATIVE WALL THICKNESS RATIO: 0.38
ECHO LVOT AREA: 3.1 CM2
ECHO LVOT AV VTI INDEX: 0.5
ECHO LVOT DIAM: 2 CM
ECHO LVOT MEAN GRADIENT: 3 MMHG
ECHO LVOT PEAK GRADIENT: 5 MMHG
ECHO LVOT PEAK VELOCITY: 1.1 M/S
ECHO LVOT STROKE VOLUME INDEX: 43.9 ML/M2
ECHO LVOT SV: 72.8 ML
ECHO LVOT VTI: 23.2 CM
ECHO MV A VELOCITY: 0.64 M/S
ECHO MV AREA VTI: 1.9 CM2
ECHO MV E DECELERATION TIME (DT): 304 MS
ECHO MV E VELOCITY: 0.76 M/S
ECHO MV E/A RATIO: 1.19
ECHO MV E/E' LATERAL: 9.71
ECHO MV E/E' RATIO (AVERAGED): 11.21
ECHO MV E/E' SEPTAL: 12.71
ECHO MV LVOT VTI INDEX: 1.61
ECHO MV MAX VELOCITY: 1.1 M/S
ECHO MV MEAN GRADIENT: 2 MMHG
ECHO MV MEAN VELOCITY: 0.7 M/S
ECHO MV PEAK GRADIENT: 5 MMHG
ECHO MV VTI: 37.4 CM
ECHO RA AREA 4C: 12.4 CM2
ECHO RA END SYSTOLIC VOLUME APICAL 4 CHAMBER INDEX BSA: 16 ML/M2
ECHO RA VOLUME: 27 ML
ECHO RIGHT VENTRICULAR SYSTOLIC PRESSURE (RVSP): 33 MMHG
ECHO RV FREE WALL PEAK S': 12.3 CM/S
ECHO RV TAPSE: 2.1 CM (ref 1.7–?)
ECHO TV REGURGITANT MAX VELOCITY: 2.74 M/S
ECHO TV REGURGITANT PEAK GRADIENT: 30 MMHG
EOSINOPHIL # BLD: 0.1 K/UL (ref 0–0.6)
EOSINOPHIL NFR BLD: 2.9 %
GFR SERPLBLD CREATININE-BSD FMLA CKD-EPI: 29 ML/MIN/{1.73_M2}
GLUCOSE SERPL-MCNC: 87 MG/DL (ref 70–99)
HCT VFR BLD AUTO: 36.4 % (ref 36–48)
HGB BLD-MCNC: 12.2 G/DL (ref 12–16)
LYMPHOCYTES # BLD: 1.4 K/UL (ref 1–5.1)
LYMPHOCYTES NFR BLD: 30.3 %
MAGNESIUM SERPL-MCNC: 2.31 MG/DL (ref 1.8–2.4)
MCH RBC QN AUTO: 30.5 PG (ref 26–34)
MCHC RBC AUTO-ENTMCNC: 33.6 G/DL (ref 31–36)
MCV RBC AUTO: 90.7 FL (ref 80–100)
MONOCYTES # BLD: 0.6 K/UL (ref 0–1.3)
MONOCYTES NFR BLD: 13.4 %
NEUTROPHILS # BLD: 2.4 K/UL (ref 1.7–7.7)
NEUTROPHILS NFR BLD: 52.6 %
NT-PROBNP SERPL-MCNC: 2907 PG/ML (ref 0–449)
PLATELET # BLD AUTO: 188 K/UL (ref 135–450)
PMV BLD AUTO: 6.7 FL (ref 5–10.5)
POTASSIUM SERPL-SCNC: 4.2 MMOL/L (ref 3.5–5.1)
RBC # BLD AUTO: 4.01 M/UL (ref 4–5.2)
SODIUM SERPL-SCNC: 139 MMOL/L (ref 136–145)
WBC # BLD AUTO: 4.6 K/UL (ref 4–11)

## 2025-08-18 PROCEDURE — 6360000002 HC RX W HCPCS: Performed by: STUDENT IN AN ORGANIZED HEALTH CARE EDUCATION/TRAINING PROGRAM

## 2025-08-18 PROCEDURE — 36415 COLL VENOUS BLD VENIPUNCTURE: CPT

## 2025-08-18 PROCEDURE — 93306 TTE W/DOPPLER COMPLETE: CPT | Performed by: INTERNAL MEDICINE

## 2025-08-18 PROCEDURE — 93306 TTE W/DOPPLER COMPLETE: CPT

## 2025-08-18 PROCEDURE — 80048 BASIC METABOLIC PNL TOTAL CA: CPT

## 2025-08-18 PROCEDURE — 6370000000 HC RX 637 (ALT 250 FOR IP): Performed by: INTERNAL MEDICINE

## 2025-08-18 PROCEDURE — 1200000000 HC SEMI PRIVATE

## 2025-08-18 PROCEDURE — 83735 ASSAY OF MAGNESIUM: CPT

## 2025-08-18 PROCEDURE — 83880 ASSAY OF NATRIURETIC PEPTIDE: CPT

## 2025-08-18 PROCEDURE — 2500000003 HC RX 250 WO HCPCS: Performed by: INTERNAL MEDICINE

## 2025-08-18 PROCEDURE — 85025 COMPLETE CBC W/AUTO DIFF WBC: CPT

## 2025-08-18 PROCEDURE — 2500000003 HC RX 250 WO HCPCS: Performed by: STUDENT IN AN ORGANIZED HEALTH CARE EDUCATION/TRAINING PROGRAM

## 2025-08-18 PROCEDURE — 6360000002 HC RX W HCPCS: Performed by: INTERNAL MEDICINE

## 2025-08-18 RX ADMIN — APIXABAN 2.5 MG: 2.5 TABLET, FILM COATED ORAL at 09:12

## 2025-08-18 RX ADMIN — METOPROLOL TARTRATE 25 MG: 25 TABLET, FILM COATED ORAL at 20:13

## 2025-08-18 RX ADMIN — WATER 1000 MG: 1 INJECTION INTRAMUSCULAR; INTRAVENOUS; SUBCUTANEOUS at 09:12

## 2025-08-18 RX ADMIN — METOPROLOL TARTRATE 25 MG: 25 TABLET, FILM COATED ORAL at 09:12

## 2025-08-18 RX ADMIN — FUROSEMIDE 20 MG: 10 INJECTION, SOLUTION INTRAMUSCULAR; INTRAVENOUS at 09:12

## 2025-08-18 RX ADMIN — SODIUM CHLORIDE, PRESERVATIVE FREE 10 ML: 5 INJECTION INTRAVENOUS at 09:20

## 2025-08-18 RX ADMIN — APIXABAN 2.5 MG: 2.5 TABLET, FILM COATED ORAL at 20:13

## 2025-08-18 RX ADMIN — FUROSEMIDE 20 MG: 10 INJECTION, SOLUTION INTRAMUSCULAR; INTRAVENOUS at 18:26

## 2025-08-18 RX ADMIN — SODIUM CHLORIDE, PRESERVATIVE FREE 10 ML: 5 INJECTION INTRAVENOUS at 20:14

## 2025-08-18 ASSESSMENT — PAIN SCALES - GENERAL
PAINLEVEL_OUTOF10: 0
PAINLEVEL_OUTOF10: 0

## 2025-08-19 VITALS
WEIGHT: 128.09 LBS | BODY MASS INDEX: 20.59 KG/M2 | HEIGHT: 66 IN | SYSTOLIC BLOOD PRESSURE: 146 MMHG | DIASTOLIC BLOOD PRESSURE: 83 MMHG | RESPIRATION RATE: 16 BRPM | OXYGEN SATURATION: 97 % | HEART RATE: 72 BPM | TEMPERATURE: 97.5 F

## 2025-08-19 LAB
ANION GAP SERPL CALCULATED.3IONS-SCNC: 12 MMOL/L (ref 3–16)
BACTERIA UR CULT: ABNORMAL
BASOPHILS # BLD: 0 K/UL (ref 0–0.2)
BASOPHILS NFR BLD: 0.8 %
BUN SERPL-MCNC: 44 MG/DL (ref 7–20)
CALCIUM SERPL-MCNC: 9.6 MG/DL (ref 8.3–10.6)
CHLORIDE SERPL-SCNC: 102 MMOL/L (ref 99–110)
CO2 SERPL-SCNC: 28 MMOL/L (ref 21–32)
CREAT SERPL-MCNC: 1.7 MG/DL (ref 0.6–1.2)
DEPRECATED RDW RBC AUTO: 14.3 % (ref 12.4–15.4)
EOSINOPHIL # BLD: 0.1 K/UL (ref 0–0.6)
EOSINOPHIL NFR BLD: 2.3 %
GFR SERPLBLD CREATININE-BSD FMLA CKD-EPI: 27 ML/MIN/{1.73_M2}
GLUCOSE SERPL-MCNC: 94 MG/DL (ref 70–99)
HCT VFR BLD AUTO: 36.8 % (ref 36–48)
HGB BLD-MCNC: 12.5 G/DL (ref 12–16)
LYMPHOCYTES # BLD: 1.8 K/UL (ref 1–5.1)
LYMPHOCYTES NFR BLD: 39.5 %
MAGNESIUM SERPL-MCNC: 2.38 MG/DL (ref 1.8–2.4)
MCH RBC QN AUTO: 30.7 PG (ref 26–34)
MCHC RBC AUTO-ENTMCNC: 34.1 G/DL (ref 31–36)
MCV RBC AUTO: 90.1 FL (ref 80–100)
MONOCYTES # BLD: 0.6 K/UL (ref 0–1.3)
MONOCYTES NFR BLD: 12.2 %
NEUTROPHILS # BLD: 2.1 K/UL (ref 1.7–7.7)
NEUTROPHILS NFR BLD: 45.2 %
ORGANISM: ABNORMAL
PLATELET # BLD AUTO: 193 K/UL (ref 135–450)
PMV BLD AUTO: 6.7 FL (ref 5–10.5)
POTASSIUM SERPL-SCNC: 3.6 MMOL/L (ref 3.5–5.1)
RBC # BLD AUTO: 4.09 M/UL (ref 4–5.2)
SODIUM SERPL-SCNC: 142 MMOL/L (ref 136–145)
WBC # BLD AUTO: 4.6 K/UL (ref 4–11)

## 2025-08-19 PROCEDURE — 6360000002 HC RX W HCPCS: Performed by: STUDENT IN AN ORGANIZED HEALTH CARE EDUCATION/TRAINING PROGRAM

## 2025-08-19 PROCEDURE — 2500000003 HC RX 250 WO HCPCS: Performed by: INTERNAL MEDICINE

## 2025-08-19 PROCEDURE — 80048 BASIC METABOLIC PNL TOTAL CA: CPT

## 2025-08-19 PROCEDURE — 85025 COMPLETE CBC W/AUTO DIFF WBC: CPT

## 2025-08-19 PROCEDURE — 2500000003 HC RX 250 WO HCPCS: Performed by: STUDENT IN AN ORGANIZED HEALTH CARE EDUCATION/TRAINING PROGRAM

## 2025-08-19 PROCEDURE — 83735 ASSAY OF MAGNESIUM: CPT

## 2025-08-19 PROCEDURE — 6370000000 HC RX 637 (ALT 250 FOR IP): Performed by: INTERNAL MEDICINE

## 2025-08-19 PROCEDURE — 6360000002 HC RX W HCPCS: Performed by: INTERNAL MEDICINE

## 2025-08-19 PROCEDURE — 36415 COLL VENOUS BLD VENIPUNCTURE: CPT

## 2025-08-19 RX ORDER — FUROSEMIDE 20 MG/1
20 TABLET ORAL SEE ADMIN INSTRUCTIONS
Qty: 15 TABLET | Refills: 0 | Status: SHIPPED | OUTPATIENT
Start: 2025-08-19

## 2025-08-19 RX ORDER — HYDROCHLOROTHIAZIDE 25 MG/1
12.5 TABLET ORAL EVERY MORNING
Qty: 15 TABLET | Refills: 0 | Status: SHIPPED | OUTPATIENT
Start: 2025-08-19

## 2025-08-19 RX ADMIN — FUROSEMIDE 20 MG: 10 INJECTION, SOLUTION INTRAMUSCULAR; INTRAVENOUS at 09:02

## 2025-08-19 RX ADMIN — SODIUM CHLORIDE, PRESERVATIVE FREE 10 ML: 5 INJECTION INTRAVENOUS at 09:13

## 2025-08-19 RX ADMIN — METOPROLOL TARTRATE 25 MG: 25 TABLET, FILM COATED ORAL at 09:02

## 2025-08-19 RX ADMIN — APIXABAN 2.5 MG: 2.5 TABLET, FILM COATED ORAL at 09:02

## 2025-08-19 RX ADMIN — WATER 1000 MG: 1 INJECTION INTRAMUSCULAR; INTRAVENOUS; SUBCUTANEOUS at 09:04

## 2025-08-19 ASSESSMENT — PAIN SCALES - GENERAL: PAINLEVEL_OUTOF10: 0

## 2025-08-20 ENCOUNTER — FOLLOWUP TELEPHONE ENCOUNTER (OUTPATIENT)
Dept: TELEMETRY | Age: 89
End: 2025-08-20